# Patient Record
Sex: FEMALE | Race: WHITE | NOT HISPANIC OR LATINO
[De-identification: names, ages, dates, MRNs, and addresses within clinical notes are randomized per-mention and may not be internally consistent; named-entity substitution may affect disease eponyms.]

---

## 2018-02-24 ENCOUNTER — RESULT REVIEW (OUTPATIENT)
Age: 63
End: 2018-02-24

## 2018-10-05 ENCOUNTER — TRANSCRIPTION ENCOUNTER (OUTPATIENT)
Age: 63
End: 2018-10-05

## 2019-03-29 PROBLEM — Z00.00 ENCOUNTER FOR PREVENTIVE HEALTH EXAMINATION: Status: ACTIVE | Noted: 2019-03-29

## 2019-04-02 ENCOUNTER — APPOINTMENT (OUTPATIENT)
Age: 64
End: 2019-04-02
Payer: COMMERCIAL

## 2019-04-02 VITALS — HEIGHT: 65 IN | BODY MASS INDEX: 28.32 KG/M2 | WEIGHT: 170 LBS

## 2019-04-02 PROCEDURE — 99203 OFFICE O/P NEW LOW 30 MIN: CPT

## 2019-04-02 NOTE — PHYSICAL EXAM
[FreeTextEntry1] : Physical exam reveals a healthy-looking patient in no apparent distress the patient appeared to be fully alert oriented and in no significant complaining side low back pain. Admission patient had good range of motion about both hips no swelling no palpable mass no gross neurovascular deficits review of the MRI scan of the lumbar sacral area demonstrates spondylolisthesis at L5-S1 with a benign-appearing nonaggressive lesion involving the lower sacrum. At this stage patient was recommended to be followed conservatively and to be seen again in 9 months with a new MRI scan of the lower sacrum

## 2019-04-02 NOTE — HISTORY OF PRESENT ILLNESS
[FreeTextEntry1] : This is the first and 15th of a 63 years old female with a chief complaint of low back pain. Recently patient was complaining about low back pain x-ray MRI scan demonstrated a mild spondylolisthesis at the level of and 5 S1 however at the same time there was an incidental finding of nonconclusive ill-defined process involving the lower sacral coccyx area

## 2019-04-16 ENCOUNTER — TRANSCRIPTION ENCOUNTER (OUTPATIENT)
Age: 64
End: 2019-04-16

## 2019-05-20 ENCOUNTER — APPOINTMENT (OUTPATIENT)
Dept: ORTHOPEDIC SURGERY | Facility: CLINIC | Age: 64
End: 2019-05-20
Payer: MEDICARE

## 2019-05-20 VITALS
WEIGHT: 170 LBS | BODY MASS INDEX: 28.32 KG/M2 | HEART RATE: 67 BPM | DIASTOLIC BLOOD PRESSURE: 91 MMHG | SYSTOLIC BLOOD PRESSURE: 162 MMHG | HEIGHT: 65 IN

## 2019-05-20 DIAGNOSIS — M51.36 OTHER INTERVERTEBRAL DISC DEGENERATION, LUMBAR REGION: ICD-10-CM

## 2019-05-20 PROCEDURE — 99215 OFFICE O/P EST HI 40 MIN: CPT

## 2019-05-20 PROCEDURE — 72170 X-RAY EXAM OF PELVIS: CPT

## 2019-05-20 PROCEDURE — 72100 X-RAY EXAM L-S SPINE 2/3 VWS: CPT

## 2019-05-29 ENCOUNTER — APPOINTMENT (OUTPATIENT)
Dept: SPINE | Facility: CLINIC | Age: 64
End: 2019-05-29
Payer: MEDICARE

## 2019-05-29 VITALS
BODY MASS INDEX: 28.32 KG/M2 | DIASTOLIC BLOOD PRESSURE: 90 MMHG | WEIGHT: 170 LBS | SYSTOLIC BLOOD PRESSURE: 150 MMHG | HEIGHT: 65 IN

## 2019-05-29 DIAGNOSIS — Z80.8 FAMILY HISTORY OF MALIGNANT NEOPLASM OF OTHER ORGANS OR SYSTEMS: ICD-10-CM

## 2019-05-29 DIAGNOSIS — Z85.828 PERSONAL HISTORY OF OTHER MALIGNANT NEOPLASM OF SKIN: ICD-10-CM

## 2019-05-29 PROCEDURE — 99203 OFFICE O/P NEW LOW 30 MIN: CPT

## 2019-05-30 NOTE — DATA REVIEWED
[de-identified] : Lumbar MRI from [facility] 3/23/2019, MRI of pelvis and sacrum area 3/28/2019 from [facility]

## 2019-05-30 NOTE — ASSESSMENT
[FreeTextEntry1] : Assess:\par Suspicion for chordoma of sacral area S 3\par Right hip and thigh pain\par Possible fracture at the level of S 3\par \par PLAN:\par MRI of sacral  / pelvic area with and without contrast \par Follow up after images \par

## 2019-05-30 NOTE — REASON FOR VISIT
[New Patient Visit] : a new patient visit [Referred By: _________] : Patient was referred by JENS [Family Member] : family member [FreeTextEntry1] : Lumbar bony  lesion

## 2019-05-30 NOTE — HISTORY OF PRESENT ILLNESS
[< 3 months] : less than 3 months [FreeTextEntry1] : Pelvic/Sacral lesion at S 3 bony lesion  [de-identified] : This is a 64 year old female who presented in February 2019 with  right hip pain.  The pain radiates into the right thigh and multiple diagnostic studies have been obtained from orthopedics and ortho-spine specialists.  The pain impacts her walking and recently  was placed on Meloxicam where the pain has significantly improved.   A question of a spinal lesion was noted at S 3 level.  There is a high suspicion that the lesion is a chordoma and should be further investigated.  The patient denies any bowel or bladder symptoms.

## 2019-05-31 ENCOUNTER — TRANSCRIPTION ENCOUNTER (OUTPATIENT)
Age: 64
End: 2019-05-31

## 2019-06-03 ENCOUNTER — APPOINTMENT (OUTPATIENT)
Dept: MRI IMAGING | Facility: CLINIC | Age: 64
End: 2019-06-03
Payer: MEDICARE

## 2019-06-03 ENCOUNTER — OUTPATIENT (OUTPATIENT)
Dept: OUTPATIENT SERVICES | Facility: HOSPITAL | Age: 64
LOS: 1 days | End: 2019-06-03
Payer: MEDICARE

## 2019-06-03 DIAGNOSIS — M51.36 OTHER INTERVERTEBRAL DISC DEGENERATION, LUMBAR REGION: ICD-10-CM

## 2019-06-03 DIAGNOSIS — M89.9 DISORDER OF BONE, UNSPECIFIED: ICD-10-CM

## 2019-06-03 PROCEDURE — 72158 MRI LUMBAR SPINE W/O & W/DYE: CPT | Mod: 26

## 2019-06-03 PROCEDURE — A9585: CPT

## 2019-06-03 PROCEDURE — 72158 MRI LUMBAR SPINE W/O & W/DYE: CPT

## 2019-06-05 ENCOUNTER — APPOINTMENT (OUTPATIENT)
Dept: SPINE | Facility: CLINIC | Age: 64
End: 2019-06-05
Payer: MEDICARE

## 2019-06-05 VITALS
WEIGHT: 170 LBS | SYSTOLIC BLOOD PRESSURE: 124 MMHG | HEIGHT: 65 IN | BODY MASS INDEX: 28.32 KG/M2 | DIASTOLIC BLOOD PRESSURE: 60 MMHG

## 2019-06-05 PROCEDURE — 99213 OFFICE O/P EST LOW 20 MIN: CPT

## 2019-06-05 RX ORDER — TELMISARTAN AND HYDROCHLOROTHIAZIDE 40; 12.5 MG/1; MG/1
40-12.5 TABLET ORAL
Refills: 0 | Status: DISCONTINUED | COMMUNITY

## 2019-06-05 RX ORDER — MELOXICAM 15 MG/1
15 TABLET ORAL
Refills: 0 | Status: DISCONTINUED | COMMUNITY
End: 2019-06-05

## 2019-06-05 RX ORDER — ROSUVASTATIN CALCIUM 10 MG/1
10 TABLET, FILM COATED ORAL
Refills: 0 | Status: ACTIVE | COMMUNITY

## 2019-06-05 NOTE — REASON FOR VISIT
[Family Member] : family member [Follow-Up: _____] : a [unfilled] follow-up visit [FreeTextEntry1] : 64 year old female who presents with a history of hip pain on the  right side radiating to the thigh since February 2019.  She presents today for review of a MRI of the sacral area. A S 3 lesion is present and is suspicious for a chordoma.   She reports her hip pain has improved since her last visit a few weeks ago without any interventions.  She is able to ambulate easier and and going up and down stairs is less taxing.  No bowel or bladder symptoms.

## 2019-06-05 NOTE — ASSESSMENT
[FreeTextEntry1] : Assess:\par Sacral S 3 lesion \par MRI shows S 3 lesion and suspicion for benign lesion but can not rule out concern for malignancy\par \par PLAN:\par Recommend biopsy at S 3 level\par Recommend CT guided biopsy and will refer to Dr Marquis\par Refer to Surgery - Dr Marquis\par Follow up after Dr Marquis evaluation  \par An order for CT of abdomen, pelvis  and chest will be performed to rule out any other lesions

## 2019-06-06 ENCOUNTER — APPOINTMENT (OUTPATIENT)
Dept: SURGICAL ONCOLOGY | Facility: CLINIC | Age: 64
End: 2019-06-06
Payer: MEDICARE

## 2019-06-06 VITALS
HEIGHT: 65 IN | SYSTOLIC BLOOD PRESSURE: 140 MMHG | WEIGHT: 173 LBS | OXYGEN SATURATION: 97 % | BODY MASS INDEX: 28.82 KG/M2 | HEART RATE: 57 BPM | DIASTOLIC BLOOD PRESSURE: 82 MMHG

## 2019-06-06 PROCEDURE — 99205 OFFICE O/P NEW HI 60 MIN: CPT

## 2019-06-06 NOTE — HISTORY OF PRESENT ILLNESS
[de-identified] : 64 year old female presents for initial consultation.  She was referred by Dr. Fawad Bashir.\par \par History of right hip pain with radiation to hip for which she assumed was sciatica. She underwent a MRI pelvis/sacrum and was noted with  an abnormality of the posterior S3 vertebral body with adjacent soft tissue mass suspected within the thecal sac extending within bilateral neural foramina at S3-S4 possibly a chordoma.  She continues to have discomfort although it has improved tremendously.\par \par PMH otherwise significant for HTN and HLD.  She is s/p left lower eyelid surgery for BCC.\par Family history of cancer: Prostate in father and brother.  Glioblastoma in mother.

## 2019-06-06 NOTE — ASSESSMENT
[FreeTextEntry1] : Impression:\par Soft tissue mass posterior to S3.  Rule out chordoma.\par \par \par Plan:\par Scheduled for CT C/A/P on 6/8/19\par CT- guided biopsy of mass\par Probable resection\par

## 2019-06-06 NOTE — CONSULT LETTER
[Dear  ___] : Dear  [unfilled], [Consult Closing:] : Thank you very much for allowing me to participate in the care of this patient.  If you have any questions, please do not hesitate to contact me. [Please see my note below.] : Please see my note below. [Consult Letter:] : I had the pleasure of evaluating your patient, [unfilled]. [Sincerely,] : Sincerely, [FreeTextEntry1] : I will speak to you after the biopsy. [FreeTextEntry3] : Ryan Marquis MD FACS\par Chief of Surgical Oncology\par \par

## 2019-06-08 ENCOUNTER — APPOINTMENT (OUTPATIENT)
Dept: CT IMAGING | Facility: CLINIC | Age: 64
End: 2019-06-08
Payer: MEDICARE

## 2019-06-08 ENCOUNTER — OUTPATIENT (OUTPATIENT)
Dept: OUTPATIENT SERVICES | Facility: HOSPITAL | Age: 64
LOS: 1 days | End: 2019-06-08
Payer: MEDICARE

## 2019-06-08 DIAGNOSIS — M89.9 DISORDER OF BONE, UNSPECIFIED: ICD-10-CM

## 2019-06-08 PROCEDURE — 74177 CT ABD & PELVIS W/CONTRAST: CPT

## 2019-06-08 PROCEDURE — 74177 CT ABD & PELVIS W/CONTRAST: CPT | Mod: 26

## 2019-06-08 PROCEDURE — 71260 CT THORAX DX C+: CPT | Mod: 26

## 2019-06-08 PROCEDURE — 71260 CT THORAX DX C+: CPT

## 2019-06-12 ENCOUNTER — TRANSCRIPTION ENCOUNTER (OUTPATIENT)
Age: 64
End: 2019-06-12

## 2019-06-16 ENCOUNTER — EMERGENCY (EMERGENCY)
Facility: HOSPITAL | Age: 64
LOS: 1 days | Discharge: HOME | End: 2019-06-16
Attending: EMERGENCY MEDICINE | Admitting: EMERGENCY MEDICINE
Payer: MEDICARE

## 2019-06-16 VITALS
OXYGEN SATURATION: 97 % | DIASTOLIC BLOOD PRESSURE: 56 MMHG | HEART RATE: 74 BPM | RESPIRATION RATE: 16 BRPM | SYSTOLIC BLOOD PRESSURE: 97 MMHG | TEMPERATURE: 98 F

## 2019-06-16 VITALS
RESPIRATION RATE: 18 BRPM | WEIGHT: 169.98 LBS | DIASTOLIC BLOOD PRESSURE: 56 MMHG | HEIGHT: 65 IN | TEMPERATURE: 99 F | SYSTOLIC BLOOD PRESSURE: 85 MMHG | HEART RATE: 78 BPM | OXYGEN SATURATION: 95 %

## 2019-06-16 LAB
ALBUMIN SERPL ELPH-MCNC: 4 G/DL — SIGNIFICANT CHANGE UP (ref 3.3–5)
ALP SERPL-CCNC: 102 U/L — SIGNIFICANT CHANGE UP (ref 40–120)
ALT FLD-CCNC: 26 U/L — SIGNIFICANT CHANGE UP (ref 12–78)
ANION GAP SERPL CALC-SCNC: 12 MMOL/L — SIGNIFICANT CHANGE UP (ref 5–17)
AST SERPL-CCNC: 15 U/L — SIGNIFICANT CHANGE UP (ref 15–37)
BASOPHILS # BLD AUTO: 0.09 K/UL — SIGNIFICANT CHANGE UP (ref 0–0.2)
BASOPHILS NFR BLD AUTO: 0.8 % — SIGNIFICANT CHANGE UP (ref 0–2)
BILIRUB SERPL-MCNC: 0.2 MG/DL — SIGNIFICANT CHANGE UP (ref 0.2–1.2)
BUN SERPL-MCNC: 25 MG/DL — HIGH (ref 7–23)
CALCIUM SERPL-MCNC: 8.6 MG/DL — SIGNIFICANT CHANGE UP (ref 8.5–10.1)
CHLORIDE SERPL-SCNC: 107 MMOL/L — SIGNIFICANT CHANGE UP (ref 96–108)
CO2 SERPL-SCNC: 21 MMOL/L — LOW (ref 22–31)
CREAT SERPL-MCNC: 1.2 MG/DL — SIGNIFICANT CHANGE UP (ref 0.5–1.3)
EOSINOPHIL # BLD AUTO: 0.22 K/UL — SIGNIFICANT CHANGE UP (ref 0–0.5)
EOSINOPHIL NFR BLD AUTO: 2 % — SIGNIFICANT CHANGE UP (ref 0–6)
GLUCOSE SERPL-MCNC: 107 MG/DL — HIGH (ref 70–99)
HCT VFR BLD CALC: 42.4 % — SIGNIFICANT CHANGE UP (ref 34.5–45)
HGB BLD-MCNC: 14.2 G/DL — SIGNIFICANT CHANGE UP (ref 11.5–15.5)
IMM GRANULOCYTES NFR BLD AUTO: 0.6 % — SIGNIFICANT CHANGE UP (ref 0–1.5)
LYMPHOCYTES # BLD AUTO: 38.3 % — SIGNIFICANT CHANGE UP (ref 13–44)
LYMPHOCYTES # BLD AUTO: 4.12 K/UL — HIGH (ref 1–3.3)
MCHC RBC-ENTMCNC: 29.6 PG — SIGNIFICANT CHANGE UP (ref 27–34)
MCHC RBC-ENTMCNC: 33.5 GM/DL — SIGNIFICANT CHANGE UP (ref 32–36)
MCV RBC AUTO: 88.5 FL — SIGNIFICANT CHANGE UP (ref 80–100)
MONOCYTES # BLD AUTO: 0.52 K/UL — SIGNIFICANT CHANGE UP (ref 0–0.9)
MONOCYTES NFR BLD AUTO: 4.8 % — SIGNIFICANT CHANGE UP (ref 2–14)
NEUTROPHILS # BLD AUTO: 5.75 K/UL — SIGNIFICANT CHANGE UP (ref 1.8–7.4)
NEUTROPHILS NFR BLD AUTO: 53.5 % — SIGNIFICANT CHANGE UP (ref 43–77)
NRBC # BLD: 0 /100 WBCS — SIGNIFICANT CHANGE UP (ref 0–0)
PLATELET # BLD AUTO: 305 K/UL — SIGNIFICANT CHANGE UP (ref 150–400)
POTASSIUM SERPL-MCNC: 3.9 MMOL/L — SIGNIFICANT CHANGE UP (ref 3.5–5.3)
POTASSIUM SERPL-SCNC: 3.9 MMOL/L — SIGNIFICANT CHANGE UP (ref 3.5–5.3)
PROT SERPL-MCNC: 7.5 G/DL — SIGNIFICANT CHANGE UP (ref 6–8.3)
RBC # BLD: 4.79 M/UL — SIGNIFICANT CHANGE UP (ref 3.8–5.2)
RBC # FLD: 13.2 % — SIGNIFICANT CHANGE UP (ref 10.3–14.5)
SODIUM SERPL-SCNC: 140 MMOL/L — SIGNIFICANT CHANGE UP (ref 135–145)
TROPONIN I SERPL-MCNC: <.015 NG/ML — SIGNIFICANT CHANGE UP (ref 0.01–0.04)
WBC # BLD: 10.76 K/UL — HIGH (ref 3.8–10.5)
WBC # FLD AUTO: 10.76 K/UL — HIGH (ref 3.8–10.5)

## 2019-06-16 PROCEDURE — 85027 COMPLETE CBC AUTOMATED: CPT

## 2019-06-16 PROCEDURE — 84484 ASSAY OF TROPONIN QUANT: CPT

## 2019-06-16 PROCEDURE — 99283 EMERGENCY DEPT VISIT LOW MDM: CPT

## 2019-06-16 PROCEDURE — 99283 EMERGENCY DEPT VISIT LOW MDM: CPT | Mod: 25

## 2019-06-16 PROCEDURE — 93005 ELECTROCARDIOGRAM TRACING: CPT

## 2019-06-16 PROCEDURE — 80053 COMPREHEN METABOLIC PANEL: CPT

## 2019-06-16 PROCEDURE — 36415 COLL VENOUS BLD VENIPUNCTURE: CPT

## 2019-06-16 PROCEDURE — 93010 ELECTROCARDIOGRAM REPORT: CPT

## 2019-06-16 RX ORDER — SODIUM CHLORIDE 9 MG/ML
1000 INJECTION INTRAMUSCULAR; INTRAVENOUS; SUBCUTANEOUS ONCE
Refills: 0 | Status: COMPLETED | OUTPATIENT
Start: 2019-06-16 | End: 2019-06-16

## 2019-06-16 RX ORDER — SODIUM CHLORIDE 9 MG/ML
1000 INJECTION INTRAMUSCULAR; INTRAVENOUS; SUBCUTANEOUS ONCE
Refills: 0 | Status: DISCONTINUED | OUTPATIENT
Start: 2019-06-16 | End: 2019-06-16

## 2019-06-16 RX ADMIN — SODIUM CHLORIDE 2000 MILLILITER(S): 9 INJECTION INTRAMUSCULAR; INTRAVENOUS; SUBCUTANEOUS at 21:38

## 2019-06-16 RX ADMIN — SODIUM CHLORIDE 1000 MILLILITER(S): 9 INJECTION INTRAMUSCULAR; INTRAVENOUS; SUBCUTANEOUS at 22:08

## 2019-06-16 NOTE — ED ADULT NURSE NOTE - CHPI ED NUR SYMPTOMS NEG
no shortness of breath/no nausea/no syncope/no chills/no vomiting/no congestion/no back pain/no diaphoresis/no chest pain/no dizziness/no fever

## 2019-06-16 NOTE — ED ADULT NURSE NOTE - NSIMPLEMENTINTERV_GEN_ALL_ED
Implemented All Universal Safety Interventions:  [place] to call system. Call bell, personal items and telephone within reach. Instruct patient to call for assistance. Room bathroom lighting operational. Non-slip footwear when patient is off stretcher. Physically safe environment: no spills, clutter or unnecessary equipment. Stretcher in lowest position, wheels locked, appropriate side rails in place.

## 2019-06-16 NOTE — ED ADULT NURSE NOTE - OBJECTIVE STATEMENT
Patient with history of HTN (micardis/ hctz) and high cholesterol (crestor) BIBA from home after daughter called 911 after seeing mom "slumped over in the car and moaning".  Patient does not remember this.  Patient was coming home from dinner, had a couple of glasses of wine, and felt neck "weakness" and "wasn't feeling good".  Per daughter, patient was diaphoretic and pale.  Patient started on micardis/ hctz and crestor on june 1st, smokes a half pack of cigarettes every day and has not previously had cardiac work up. Patient is new to the area and recently began seeing her new PMD.  Patient states recent dx of        and is scheduled for biopsy of mass found after lumbar.  Patient arrives hypotensive; does not take BP at home.  Patient currently at her baseline, is A& O x4 and neurologically intact.  Denies CP, SOB, lightheadedness, dizziness, prior episodes of syncope, back pain, neck pain or weakness, numbness and tingling.  Daughters at the bedside, bed in the lowest position, safety maintained.  Pending EKG. Patient with history of HTN (micardis/ hctz) and high cholesterol (crestor) BIBA from home after daughter called 911 after seeing mom "slumped over in the car and moaning".  Patient does not remember this.  Patient was coming home from dinner, had a couple of glasses of wine, and felt neck "weakness" and "wasn't feeling good".  Per daughter, patient was diaphoretic and pale.  Patient started on micardis/ hctz and crestor on june 1st, smokes a half pack of cigarettes every day and has not previously had cardiac work up. Patient is new to the area and recently began seeing her new PMD.  Patient states recent dx of sacral CA and is scheduled for biopsy.  Patient arrives hypotensive; does not take BP at home.  Patient currently at her baseline, is A& O x4 and neurologically intact.  Denies CP, SOB, lightheadedness, dizziness, prior episodes of syncope, back pain, neck pain or weakness, numbness and tingling.  Daughters at the bedside, bed in the lowest position, safety maintained.  Pending EKG.

## 2019-06-16 NOTE — ED ADULT NURSE REASSESSMENT NOTE - NS ED NURSE REASSESS COMMENT FT1
Patient ambulatory independently with steady gait to the bathroom.  Patient denies lightheadedness or dizziness.  Patient is ok for discharge and educated on importance of discontinuing BP meds until she sees her PMD.  Patient verbalizes understanding to call PMD to make an appointment tomorrow morning when the office opens.   is at the bedside and will drive patient home.

## 2019-06-16 NOTE — ED ADULT NURSE NOTE - CHIEF COMPLAINT QUOTE
BIBEMS for dizziness and lightheadedness. Patient states she was started on a new medication recently.

## 2019-06-17 RX ORDER — ROSUVASTATIN CALCIUM 5 MG/1
1 TABLET ORAL
Qty: 0 | Refills: 0 | DISCHARGE

## 2019-06-17 RX ORDER — HYDROCHLOROTHIAZIDE 25 MG
0 TABLET ORAL
Qty: 0 | Refills: 0 | DISCHARGE

## 2019-06-19 ENCOUNTER — APPOINTMENT (OUTPATIENT)
Dept: SPINE | Facility: CLINIC | Age: 64
End: 2019-06-19
Payer: MEDICARE

## 2019-06-19 VITALS
WEIGHT: 173 LBS | DIASTOLIC BLOOD PRESSURE: 80 MMHG | SYSTOLIC BLOOD PRESSURE: 130 MMHG | BODY MASS INDEX: 28.82 KG/M2 | HEIGHT: 65 IN

## 2019-06-19 PROCEDURE — 99213 OFFICE O/P EST LOW 20 MIN: CPT

## 2019-06-20 NOTE — REASON FOR VISIT
[Follow-Up: _____] : a [unfilled] follow-up visit [Family Member] : family member [FreeTextEntry1] : 64 year old with a history of right hip apin with groin pain since February 2019.  She has undergone multiple neuroimaging and  a S 3 lesion was noted.  She has undergone a CT of abdomen, pelvis an chest to rule out other  etiologies.  The scans are negative bu the S 3 lesion shows multiple lesions surrounding the S 3 area.  She has actually improved with her pain but her right leg is painful.    It still remains unclear what type of lesion is present in S3 .

## 2019-06-20 NOTE — ASSESSMENT
[FreeTextEntry1] : Assess:\par Right hip pain and back pain \par S 3 lesion \par Tt of abdomen and pelvis show no other lesions\par \par PLAN:  \par CT guided biopsy of S 3 lesion ordered\par Blood work of pT PTT INR and CBC ordered in prep\par She will return after biopsy complete\par

## 2019-07-02 ENCOUNTER — RESULT REVIEW (OUTPATIENT)
Age: 64
End: 2019-07-02

## 2019-07-02 ENCOUNTER — OUTPATIENT (OUTPATIENT)
Dept: OUTPATIENT SERVICES | Facility: HOSPITAL | Age: 64
LOS: 1 days | End: 2019-07-02
Payer: MEDICARE

## 2019-07-02 ENCOUNTER — APPOINTMENT (OUTPATIENT)
Dept: CT IMAGING | Facility: HOSPITAL | Age: 64
End: 2019-07-02

## 2019-07-02 DIAGNOSIS — Z00.00 ENCOUNTER FOR GENERAL ADULT MEDICAL EXAMINATION WITHOUT ABNORMAL FINDINGS: ICD-10-CM

## 2019-07-02 DIAGNOSIS — M51.36 OTHER INTERVERTEBRAL DISC DEGENERATION, LUMBAR REGION: ICD-10-CM

## 2019-07-02 PROCEDURE — 88172 CYTP DX EVAL FNA 1ST EA SITE: CPT

## 2019-07-02 PROCEDURE — 88365 INSITU HYBRIDIZATION (FISH): CPT

## 2019-07-02 PROCEDURE — 88360 TUMOR IMMUNOHISTOCHEM/MANUAL: CPT

## 2019-07-02 PROCEDURE — 88341 IMHCHEM/IMCYTCHM EA ADD ANTB: CPT

## 2019-07-02 PROCEDURE — 88341 IMHCHEM/IMCYTCHM EA ADD ANTB: CPT | Mod: 26,59

## 2019-07-02 PROCEDURE — 88365 INSITU HYBRIDIZATION (FISH): CPT | Mod: 26,59

## 2019-07-02 PROCEDURE — 88342 IMHCHEM/IMCYTCHM 1ST ANTB: CPT

## 2019-07-02 PROCEDURE — 88364 INSITU HYBRIDIZATION (FISH): CPT

## 2019-07-02 PROCEDURE — 88342 IMHCHEM/IMCYTCHM 1ST ANTB: CPT | Mod: 26,59

## 2019-07-02 PROCEDURE — 88305 TISSUE EXAM BY PATHOLOGIST: CPT | Mod: 26

## 2019-07-02 PROCEDURE — 88364 INSITU HYBRIDIZATION (FISH): CPT | Mod: 26

## 2019-07-02 PROCEDURE — 88173 CYTOPATH EVAL FNA REPORT: CPT

## 2019-07-02 PROCEDURE — 88173 CYTOPATH EVAL FNA REPORT: CPT | Mod: 26

## 2019-07-02 PROCEDURE — 77012 CT SCAN FOR NEEDLE BIOPSY: CPT | Mod: 26

## 2019-07-02 PROCEDURE — 88305 TISSUE EXAM BY PATHOLOGIST: CPT

## 2019-07-02 PROCEDURE — 88360 TUMOR IMMUNOHISTOCHEM/MANUAL: CPT | Mod: 26

## 2019-07-02 PROCEDURE — 20225 BONE BIOPSY TROCAR/NDL DEEP: CPT

## 2019-07-02 PROCEDURE — 77012 CT SCAN FOR NEEDLE BIOPSY: CPT

## 2019-07-10 LAB — NON-GYNECOLOGICAL CYTOLOGY STUDY: SIGNIFICANT CHANGE UP

## 2019-07-16 ENCOUNTER — MOBILE ON CALL (OUTPATIENT)
Age: 64
End: 2019-07-16

## 2019-07-18 ENCOUNTER — OUTPATIENT (OUTPATIENT)
Dept: OUTPATIENT SERVICES | Facility: HOSPITAL | Age: 64
LOS: 1 days | Discharge: HOME | End: 2019-07-18

## 2019-07-18 DIAGNOSIS — C90.00 MULTIPLE MYELOMA NOT HAVING ACHIEVED REMISSION: ICD-10-CM

## 2019-07-22 ENCOUNTER — RESULT REVIEW (OUTPATIENT)
Age: 64
End: 2019-07-22

## 2019-07-22 ENCOUNTER — APPOINTMENT (OUTPATIENT)
Dept: HEMATOLOGY ONCOLOGY | Facility: CLINIC | Age: 64
End: 2019-07-22
Payer: MEDICARE

## 2019-07-22 VITALS
TEMPERATURE: 99.3 F | HEART RATE: 82 BPM | OXYGEN SATURATION: 99 % | DIASTOLIC BLOOD PRESSURE: 83 MMHG | BODY MASS INDEX: 29.17 KG/M2 | RESPIRATION RATE: 16 BRPM | WEIGHT: 175.25 LBS | SYSTOLIC BLOOD PRESSURE: 143 MMHG

## 2019-07-22 LAB
BASOPHILS # BLD AUTO: 0.1 K/UL — SIGNIFICANT CHANGE UP (ref 0–0.2)
BASOPHILS NFR BLD AUTO: 1.2 % — SIGNIFICANT CHANGE UP (ref 0–2)
EOSINOPHIL # BLD AUTO: 0.1 K/UL — SIGNIFICANT CHANGE UP (ref 0–0.5)
EOSINOPHIL NFR BLD AUTO: 1.8 % — SIGNIFICANT CHANGE UP (ref 0–6)
HCT VFR BLD CALC: 41.2 % — SIGNIFICANT CHANGE UP (ref 34.5–45)
HGB BLD-MCNC: 13.6 G/DL — SIGNIFICANT CHANGE UP (ref 11.5–15.5)
LYMPHOCYTES # BLD AUTO: 3 K/UL — SIGNIFICANT CHANGE UP (ref 1–3.3)
LYMPHOCYTES # BLD AUTO: 40.2 % — SIGNIFICANT CHANGE UP (ref 13–44)
MCHC RBC-ENTMCNC: 30.1 PG — SIGNIFICANT CHANGE UP (ref 27–34)
MCHC RBC-ENTMCNC: 33 G/DL — SIGNIFICANT CHANGE UP (ref 32–36)
MCV RBC AUTO: 91 FL — SIGNIFICANT CHANGE UP (ref 80–100)
MONOCYTES # BLD AUTO: 0.5 K/UL — SIGNIFICANT CHANGE UP (ref 0–0.9)
MONOCYTES NFR BLD AUTO: 6.8 % — SIGNIFICANT CHANGE UP (ref 2–14)
NEUTROPHILS # BLD AUTO: 3.7 K/UL — SIGNIFICANT CHANGE UP (ref 1.8–7.4)
NEUTROPHILS NFR BLD AUTO: 50 % — SIGNIFICANT CHANGE UP (ref 43–77)
PLATELET # BLD AUTO: 254 K/UL — SIGNIFICANT CHANGE UP (ref 150–400)
RBC # BLD: 4.52 M/UL — SIGNIFICANT CHANGE UP (ref 3.8–5.2)
RBC # FLD: 12.5 % — SIGNIFICANT CHANGE UP (ref 10.3–14.5)
WBC # BLD: 7.5 K/UL — SIGNIFICANT CHANGE UP (ref 3.8–10.5)
WBC # FLD AUTO: 7.5 K/UL — SIGNIFICANT CHANGE UP (ref 3.8–10.5)

## 2019-07-22 PROCEDURE — 36415 COLL VENOUS BLD VENIPUNCTURE: CPT

## 2019-07-22 PROCEDURE — 99204 OFFICE O/P NEW MOD 45 MIN: CPT | Mod: 25

## 2019-07-24 ENCOUNTER — APPOINTMENT (OUTPATIENT)
Dept: HEMATOLOGY ONCOLOGY | Facility: CLINIC | Age: 64
End: 2019-07-24
Payer: MEDICARE

## 2019-07-24 ENCOUNTER — LABORATORY RESULT (OUTPATIENT)
Age: 64
End: 2019-07-24

## 2019-07-24 ENCOUNTER — RESULT REVIEW (OUTPATIENT)
Age: 64
End: 2019-07-24

## 2019-07-24 ENCOUNTER — OUTPATIENT (OUTPATIENT)
Dept: OUTPATIENT SERVICES | Facility: HOSPITAL | Age: 64
LOS: 1 days | End: 2019-07-24
Payer: MEDICARE

## 2019-07-24 VITALS
TEMPERATURE: 98.4 F | BODY MASS INDEX: 28.98 KG/M2 | WEIGHT: 174.16 LBS | RESPIRATION RATE: 16 BRPM | OXYGEN SATURATION: 96 % | SYSTOLIC BLOOD PRESSURE: 149 MMHG | HEART RATE: 57 BPM | DIASTOLIC BLOOD PRESSURE: 81 MMHG

## 2019-07-24 DIAGNOSIS — C90.00 MULTIPLE MYELOMA NOT HAVING ACHIEVED REMISSION: ICD-10-CM

## 2019-07-24 LAB
ALBUMIN SERPL ELPH-MCNC: 4.4 G/DL
ALP BLD-CCNC: 90 U/L
ALT SERPL-CCNC: 19 U/L
ANION GAP SERPL CALC-SCNC: 12 MMOL/L
AST SERPL-CCNC: 16 U/L
B2 MICROGLOB SERPL-MCNC: 2.2 MG/L
BILIRUB SERPL-MCNC: 0.2 MG/DL
BUN SERPL-MCNC: 11 MG/DL
CALCIUM SERPL-MCNC: 9.5 MG/DL
CHLORIDE SERPL-SCNC: 106 MMOL/L
CO2 SERPL-SCNC: 24 MMOL/L
CREAT SERPL-MCNC: 0.67 MG/DL
DEPRECATED KAPPA LC FREE/LAMBDA SER: 1.93 RATIO
GLUCOSE SERPL-MCNC: 99 MG/DL
KAPPA LC CSF-MCNC: 1.11 MG/DL
KAPPA LC SERPL-MCNC: 2.14 MG/DL
LDH SERPL-CCNC: 153 U/L
POTASSIUM SERPL-SCNC: 4.3 MMOL/L
PROT SERPL-MCNC: 7.1 G/DL
SODIUM SERPL-SCNC: 142 MMOL/L

## 2019-07-24 PROCEDURE — 88189 FLOWCYTOMETRY/READ 16 & >: CPT

## 2019-07-24 PROCEDURE — 88275 CYTOGENETICS 100-300: CPT

## 2019-07-24 PROCEDURE — 88365 INSITU HYBRIDIZATION (FISH): CPT | Mod: 26,59

## 2019-07-24 PROCEDURE — 88313 SPECIAL STAINS GROUP 2: CPT

## 2019-07-24 PROCEDURE — 88342 IMHCHEM/IMCYTCHM 1ST ANTB: CPT | Mod: 26,59

## 2019-07-24 PROCEDURE — 88360 TUMOR IMMUNOHISTOCHEM/MANUAL: CPT

## 2019-07-24 PROCEDURE — 88365 INSITU HYBRIDIZATION (FISH): CPT

## 2019-07-24 PROCEDURE — 85097 BONE MARROW INTERPRETATION: CPT

## 2019-07-24 PROCEDURE — 88342 IMHCHEM/IMCYTCHM 1ST ANTB: CPT

## 2019-07-24 PROCEDURE — 88271 CYTOGENETICS DNA PROBE: CPT

## 2019-07-24 PROCEDURE — 88185 FLOWCYTOMETRY/TC ADD-ON: CPT

## 2019-07-24 PROCEDURE — 38222 DX BONE MARROW BX & ASPIR: CPT | Mod: RT

## 2019-07-24 PROCEDURE — 88305 TISSUE EXAM BY PATHOLOGIST: CPT | Mod: 26

## 2019-07-24 PROCEDURE — 88280 CHROMOSOME KARYOTYPE STUDY: CPT

## 2019-07-24 PROCEDURE — 88360 TUMOR IMMUNOHISTOCHEM/MANUAL: CPT | Mod: 26

## 2019-07-24 PROCEDURE — 88364 INSITU HYBRIDIZATION (FISH): CPT | Mod: 26

## 2019-07-24 PROCEDURE — 87205 SMEAR GRAM STAIN: CPT

## 2019-07-24 PROCEDURE — 88264 CHROMOSOME ANALYSIS 20-25: CPT

## 2019-07-24 PROCEDURE — 88184 FLOWCYTOMETRY/ TC 1 MARKER: CPT

## 2019-07-24 PROCEDURE — 88305 TISSUE EXAM BY PATHOLOGIST: CPT

## 2019-07-24 PROCEDURE — 88237 TISSUE CULTURE BONE MARROW: CPT

## 2019-07-24 PROCEDURE — 88313 SPECIAL STAINS GROUP 2: CPT | Mod: 26

## 2019-07-24 PROCEDURE — 88364 INSITU HYBRIDIZATION (FISH): CPT

## 2019-07-24 NOTE — HISTORY OF PRESENT ILLNESS
[de-identified] : 65 yo female with a recent hx of severe R pelvic pain seen by ortho in June and had MRI done of L-S spine done June 3rd showed S3 soft tissue lesion with epidural extension concerning for possible neoplasm or mets, CT imaging on June 8th showed S3 lytic lesion in vertebral body, lucencies in T2, T7, T11 possibly representing hemangiomas vs lytic lesions. Pt had biopsy done by IR on July 3rd, 2019, which was nondiagnostic. Showed polytypic plasma cells, comprising less than 5% of the cells. The bone marrow showed normal trilineage hematopoesis. Additional kappa and lambda restriction is in process. The pt was referred for hematology evaluation for concern of multiple myeloma. She is accompanied with  and dtr to the office visit. She was also advised to see Dr Hart for BMT and RadOnc (Dr Hdez).\par Pt reports marked improvement in R pelvic pain, now able to walk and move w/o significant discomfort. No intervention has been done for S3 lesion as yet. She has no hx of anemia or renal insufficiency or other areas of bone pain. She has no hx of weight loss or fatigue.  [Disease:__________________________] : Disease: [unfilled]

## 2019-07-24 NOTE — CONSULT LETTER
[Dear  ___] : Dear  [unfilled], [Consult Letter:] : I had the pleasure of evaluating your patient, [unfilled]. [Please see my note below.] : Please see my note below. [Consult Closing:] : Thank you very much for allowing me to participate in the care of this patient.  If you have any questions, please do not hesitate to contact me. [Sincerely,] : Sincerely, [FreeTextEntry3] : Rosa Suazo MD. MS. \par Hematologist/Oncologist\par [facility]\par ph. [phone]\par fx. [phone]\par  [DrJonathan  ___] : Dr. COLINDRES

## 2019-07-24 NOTE — RESULTS/DATA
[FreeTextEntry1] : \par  Pathology             Final\par \par No Documents Attached\par \par \par \par \par   [facility] Accession Number : [id number]\par \par JANES MOTA 2\par \par \par \par Cytopathology Report\par \par \par \par \par \par Final Diagnosis\par BONE, VERTEBRAL BODY, S3, CT GUIDED CORE BIOPSY AND FNA\par Cellular marrow with maturing trilineage hematopoiesis and mild\par plasmacytosis\par See note and description.\par \par Diagnostic note:  Per chart review, patient has lytic lesions in\par vertebral body S3. The current biopsy consists of fragmented\par marrow showing cellular marrow with maturing trilineage\par hematopoiesis and mild plasmacytosis. The clonality of the plasma\par cell population cannot be determined by kappaISH and lambdaISH\par studies. Immunohistochemistry work up for light chain restriction\par are in proces and will be reported as an addendum. Correlation\par with clinical findings (e.g.: serum immunoglobulin levels, SPEP,\par IF studies) and if clinically indicated a bone marrow biopsy with\par flow cytometry and cytogenetic analysis is recommended.\par \par Microscopic description:   Cytology slides, touch preps, cell\par block and histologic sections of core biopsy consists of small\par fragments of marrow elements with 40-50% cellularity showing\par maturing trilineage hematopoiesis with mild plasmacytosis. Plasma\par cells are mostly distributed perivascularly and some\par interstitially, occasional small groups are seen.\par \par Immunohistochemistry:  CD20, CD3, CD34, , AE1-3, ,\par KappaISH, lambdaISH, kappa and lambda, MPO, CD71, E-cad, CD15\par stains  performed on formalin fixed paraffin embedded tissue,\par block 1B.\par \par CD34: Highlights vascular structures, no increase in blast\par population\par : Highlights erythroid series and scattered masts cells. No\par increase in blast population\par CD20: Highlights small B cells scattered interstitially\par CD3: Highlights small T cells scattered interstitially\par AE1-3: Negative\par \par  stain highlights plasma cells forming small clusters,\par comprising less than 5% marrow cellularity. KappaISH and\par lambdaISH stains are non-contributory.\par \par Kappa and lambda light chain immunostains are in process and will\par be reported as an addendum.\par \par MPO, CD71, E-cad and CD15 immunostains are in process and will be\par reported as an addendum.\par \par \par \par \par \par JANES MOTA                          2\par \par \par \par Cytopathology Report\par \par \par \par \par \par Flow cytometry analysis:  Not submitted\par \par Screened by: Christina MERCER (ASCP)\par Verified by: Vicki Prajapati MD\par (Electronic Signature)\par Reported on: 07/10/19 12:55 EDT, 6 [facility], [place], [place]\par [address]\par Cytology technical processing performed at [address] [place] [id number]\par _________________________________________________________________\par \par \par Specimen(s) Submitted\par BONE, VERTEBRAL BODY, S3, CT GUIDED CORE BIOPSY AND FNA\par \par \par Clinical Information\par Spinal cord lesion. Rule out cancer. No history of cancer. Rule\par out cordoma.\par \par CT Chest, abdomen and pelvis (06/08/2019): Lytic lesions of S3\par vertebral body, lucency in posterior T 1,  anterior T7, and\par anterior T2 vertebral bodies are indeterminate, possible\par representing small hemangiomas versus additional lytic lesions\par \par \par Gross Description\par Core Biopsy:\par Tissue collected: Specimen container has been inspected to\par confirm patient?s name and date of birth, contains multiple tan\par cores measuring 0.3 - 1.0 cm in length (and multiple fragments).\par Entire specimen submitted in 2 cassette(s) labeled 1B and 1C.\par \par 1 Touch prep slides ( 1 air dried + 0 fixed)\par \par FNA:\par Received: 4 air dried and 4 fixed slides\par Needle rinses in 20 ml formalin\par Submitted: cell block in one cassette labeled 1A\par \par Prepared:\par 1 Touch Prep, 4 Diff Quick,  4 Pap Stained slides\par 1 cell block  labeled 1A\par 1 core biopsy labeled 1B\par 1 core biopsy labeled 1C\par 12 Total slides\par \par  \par \par  Ordered by: DOCUMENT, SCM       Collected/Examined: [id number] 03:11PM       \par Verification Not Required       Stage: Final       \par  Performed at: [facility]       Resulted: 10Jul2019 12:55PM       Last Updated: 10Jul2019 12:56PM       Accession: [id number]       \par  Pathology             Final\par \par No Documents Attached\par \par \par \par \par   [facility] Accession Number : [id number]\par \par JANES MOTA                          3\par \par \par \par Cytopathology Addendum Report\par \par \par \par \par Addendum\par For reporting additional immunostain results\par \par Immunohistochemistry:\par Kappa and lambda light chain stains looks polyclonal in this\par biopsy with high background staining.\par CD71: Highlights erythroid series\par E-cad: Negative\par MPO: Highlights myeloid series\par CD15: Highlights maturing myeloid series\par \par The diagnosis remains unchanged.\par \par Verified by: Vicki Prajapati MD\par (Electronic Signature)\par Reported on: 07/15/19 15:10 EDT, 6 [place], [place]\par [address]\par _________________________________________________________________\par \par \par Cytopathology Report\par \par \par \par \par \par Final Diagnosis\par BONE, VERTEBRAL BODY, S3, CT GUIDED CORE BIOPSY AND FNA\par Cellular marrow with maturing trilineage hematopoiesis and mild\par plasmacytosis\par See note and description.\par \par Diagnostic note:  Per chart review, patient has lytic lesions in\par vertebral body S3. The current biopsy consists of fragmented\par marrow showing cellular marrow with maturing trilineage\par hematopoiesis and mild plasmacytosis. The clonality of the plasma\par cell population cannot be determined by kappaISH and lambdaISH\par studies. Immunohistochemistry work up for light chain restriction\par are in proces and will be reported as an addendum. Correlation\par with clinical findings (e.g.: serum immunoglobulin levels, SPEP,\par IF studies) and if clinically indicated a bone marrow biopsy with\par flow cytometry and cytogenetic analysis is recommended.\par \par Microscopic description:   Cytology slides, touch preps, cell\par block and histologic sections of core biopsy consists of small\par fragments of marrow elements with 40-50% cellularity showing\par maturing trilineage hematopoiesis with mild\par \par \par \par \par \par KODI MOTAANN                          3\par \par \par \par Cytopathology Report\par \par \par \par \par plasmacytosis. Plasma cells are mostly distributed perivascularly\par and some interstitially, occasional small groups are seen.\par \par Immunohistochemistry:  CD20, CD3, CD34, , AE1-3, ,\par KappaISH, lambdaISH, kappa and lambda, MPO, CD71, E-cad, CD15\par stains  performed on formalin fixed paraffin embedded tissue,\par block 1B.\par \par CD34: Highlights vascular structures, no increase in blast\par population\par : Highlights erythroid series and scattered masts cells. No\par increase in blast population\par CD20: Highlights small B cells scattered interstitially\par CD3: Highlights small T cells scattered interstitially\par AE1-3: Negative\par \par  stain highlights plasma cells forming small clusters,\par comprising less than 5% marrow cellularity. KappaISH and\par lambdaISH stains are non-contributory.\par \par Kappa and lambda light chain immunostains are in process and will\par be reported as an addendum.\par \par MPO, CD71, E-cad and CD15 immunostains are in process and will be\par reported as an addendum.\par \par Flow cytometry analysis:  Not submitted\par \par Screened by: Christina MERCER (ASCP)\par Verified by: Vicki Prajapati MD\par (Electronic Signature)\par Reported on: 07/10/19 12:55 EDT, 6 [facility], [place]\par [address]\par Cytology technical processing performed at [address] [place] [address], [place] [address]\par _________________________________________________________________\par \par \par Specimen(s) Submitted\par BONE, VERTEBRAL BODY, S3, CT GUIDED CORE BIOPSY AND FNA\par \par \par Clinical Information\par Spinal cord lesion. Rule out cancer. No history of cancer. Rule\par out cordoma.\par \par \par \par \par \par \par \par JANES MOTA                          3\par \par \par \par Cytopathology Report\par \par \par \par \par CT Chest, abdomen and pelvis (06/08/2019): Lytic lesions of S3\par vertebral body, lucency in posterior T 1,  anterior T7, and\par anterior T2 vertebral bodies are indeterminate, possible\par representing small hemangiomas versus additional lytic lesions\par \par \par Gross Description\par Core Biopsy:\par Tissue collected: Specimen container has been inspected to\par confirm patient?s name and date of birth, contains multiple tan\par cores measuring 0.3 - 1.0 cm in length (and multiple fragments).\par Entire specimen submitted in 2 cassette(s) labeled 1B and 1C.\par \par 1 Touch prep slides ( 1 air dried + 0 fixed)\par \par FNA:\par Received: 4 air dried and 4 fixed slides\par Needle rinses in 20 ml formalin\par Submitted: cell block in one cassette labeled 1A\par \par Prepared:\par 1 Touch Prep, 4 Diff Quick,  4 Pap Stained slides\par 1 cell block  labeled 1A\par 1 core biopsy labeled 1B\par 1 core biopsy labeled 1C\par 12 Total slides\par \par  \par \par  Ordered by: DOCUMENT, SCM       Collected/Examined: [id number] 03:11PM       \par Verification Not Required       Stage: Final       \par  Performed at: [facility]       Resulted: [id number] 03:10PM       Last Updated: 15Jul2019 03:10PM       Accession: [id number]

## 2019-07-24 NOTE — REVIEW OF SYSTEMS
[Joint Pain] : joint pain [Joint Stiffness] : no joint stiffness [Muscle Pain] : no muscle pain [Muscle Weakness] : no muscle weakness [Negative] : Allergic/Immunologic [FreeTextEntry9] : L achilles tendonitis

## 2019-07-24 NOTE — REASON FOR VISIT
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

## 2019-07-24 NOTE — REASON FOR VISIT
[Bone Marrow Biopsy] : bone marrow biopsy [Bone Marrow Aspiration] : bone marrow aspiration [Family Member] : family member [FreeTextEntry2] : R/O MM, Amyloid

## 2019-07-24 NOTE — PROCEDURE
[Bone Marrow Biopsy] : bone marrow biopsy [Patient] : the patient [Bone Marrow Aspiration] : bone marrow aspiration  [Correct positioning] : correct positioning [Patient identification verified] : patient identification verified [Prone] : prone [The right posterior iliac crest was prepped with betadine and draped, using sterile technique.] : The right posterior iliac crest was prepped with betadine and draped, using sterile technique. [Aspirate] : aspirate [Lidocaine was injected and into the periosteum overlying the site.] : Lidocaine was injected and into the periosteum overlying the site. [FISH] : FISH [Cytogenetics] : cytogenetics [Other ___] : [unfilled] [Flow Cytometry] : flow cytometry [Biopsy] : biopsy [FreeTextEntry1] : R/O MM, Amyloid [] : The patient was instructed to remove the bandage the following AM. The patient may bathe. Acetaminophen may be taken for discomfort, as per package directions.If there are any other problems, the patient was instructed to call the office. The patient verbalized understanding, and is aware of the office contact numbers. [FreeTextEntry2] : CBC done on 7/22/19.\par WBC 7.5\par Hgb 13.6\par Hct 41.2\par Plts 254\par BM bx and aspirate done.  Samples send for [place] Red testing.

## 2019-07-24 NOTE — PHYSICAL EXAM
[Restricted in physically strenuous activity but ambulatory and able to carry out work of a light or sedentary nature] : Status 1- Restricted in physically strenuous activity but ambulatory and able to carry out work of a light or sedentary nature, e.g., light house work, office work [Obese] : obese [de-identified] : ttp of L achilles heel [Normal] : affect appropriate

## 2019-07-25 ENCOUNTER — FORM ENCOUNTER (OUTPATIENT)
Age: 64
End: 2019-07-25

## 2019-07-25 LAB
ALBUMIN MFR SERPL ELPH: 55.7 %
ALBUMIN SERPL-MCNC: 4 G/DL
ALBUMIN/GLOB SERPL: 1.3 RATIO
ALPHA1 GLOB MFR SERPL ELPH: 5 %
ALPHA1 GLOB SERPL ELPH-MCNC: 0.4 G/DL
ALPHA2 GLOB MFR SERPL ELPH: 12.6 %
ALPHA2 GLOB SERPL ELPH-MCNC: 0.9 G/DL
B-GLOBULIN MFR SERPL ELPH: 19.1 %
B-GLOBULIN SERPL ELPH-MCNC: 1.4 G/DL
DEPRECATED KAPPA LC FREE/LAMBDA SER: 1.93 RATIO
GAMMA GLOB FLD ELPH-MCNC: 0.5 G/DL
GAMMA GLOB MFR SERPL ELPH: 7.6 %
IGA SER QL IEP: 552 MG/DL
IGG SER QL IEP: 549 MG/DL
IGM SER QL IEP: 35 MG/DL
INTERPRETATION SERPL IEP-IMP: NORMAL
KAPPA LC CSF-MCNC: 1.11 MG/DL
KAPPA LC SERPL-MCNC: 2.14 MG/DL
M PROTEIN MFR SERPL ELPH: 7.5 %
M PROTEIN SPEC IFE-MCNC: NORMAL
MONOCLON BAND OBS SERPL: 0.5 G/DL
PROT SERPL-MCNC: 7.1 G/DL
PROT SERPL-MCNC: 7.1 G/DL
TM INTERPRETATION: SIGNIFICANT CHANGE UP

## 2019-07-26 ENCOUNTER — APPOINTMENT (OUTPATIENT)
Dept: NUCLEAR MEDICINE | Facility: CLINIC | Age: 64
End: 2019-07-26
Payer: MEDICARE

## 2019-07-26 ENCOUNTER — OUTPATIENT (OUTPATIENT)
Dept: OUTPATIENT SERVICES | Facility: HOSPITAL | Age: 64
LOS: 1 days | End: 2019-07-26
Payer: MEDICARE

## 2019-07-26 DIAGNOSIS — Z00.8 ENCOUNTER FOR OTHER GENERAL EXAMINATION: ICD-10-CM

## 2019-07-26 PROCEDURE — A9552: CPT

## 2019-07-26 PROCEDURE — 78816 PET IMAGE W/CT FULL BODY: CPT

## 2019-07-26 PROCEDURE — 78816 PET IMAGE W/CT FULL BODY: CPT | Mod: 26,PI

## 2019-07-31 LAB — HEMATOPATHOLOGY REPORT: SIGNIFICANT CHANGE UP

## 2019-08-01 ENCOUNTER — APPOINTMENT (OUTPATIENT)
Dept: HEMATOLOGY ONCOLOGY | Facility: CLINIC | Age: 64
End: 2019-08-01
Payer: MEDICARE

## 2019-08-01 ENCOUNTER — RESULT REVIEW (OUTPATIENT)
Age: 64
End: 2019-08-01

## 2019-08-01 VITALS
SYSTOLIC BLOOD PRESSURE: 139 MMHG | OXYGEN SATURATION: 99 % | WEIGHT: 173.92 LBS | HEART RATE: 60 BPM | BODY MASS INDEX: 28.95 KG/M2 | RESPIRATION RATE: 16 BRPM | TEMPERATURE: 99.1 F | DIASTOLIC BLOOD PRESSURE: 77 MMHG

## 2019-08-01 LAB
BASOPHILS # BLD AUTO: 0 K/UL — SIGNIFICANT CHANGE UP (ref 0–0.2)
BASOPHILS NFR BLD AUTO: 0.5 % — SIGNIFICANT CHANGE UP (ref 0–2)
EOSINOPHIL # BLD AUTO: 0.2 K/UL — SIGNIFICANT CHANGE UP (ref 0–0.5)
EOSINOPHIL NFR BLD AUTO: 2.3 % — SIGNIFICANT CHANGE UP (ref 0–6)
HCT VFR BLD CALC: 41.2 % — SIGNIFICANT CHANGE UP (ref 34.5–45)
HGB BLD-MCNC: 13.8 G/DL — SIGNIFICANT CHANGE UP (ref 11.5–15.5)
LYMPHOCYTES # BLD AUTO: 3.6 K/UL — HIGH (ref 1–3.3)
LYMPHOCYTES # BLD AUTO: 40.2 % — SIGNIFICANT CHANGE UP (ref 13–44)
MCHC RBC-ENTMCNC: 30.6 PG — SIGNIFICANT CHANGE UP (ref 27–34)
MCHC RBC-ENTMCNC: 33.5 G/DL — SIGNIFICANT CHANGE UP (ref 32–36)
MCV RBC AUTO: 91.3 FL — SIGNIFICANT CHANGE UP (ref 80–100)
MONOCYTES # BLD AUTO: 0.4 K/UL — SIGNIFICANT CHANGE UP (ref 0–0.9)
MONOCYTES NFR BLD AUTO: 4.9 % — SIGNIFICANT CHANGE UP (ref 2–14)
NEUTROPHILS # BLD AUTO: 4.7 K/UL — SIGNIFICANT CHANGE UP (ref 1.8–7.4)
NEUTROPHILS NFR BLD AUTO: 52.1 % — SIGNIFICANT CHANGE UP (ref 43–77)
PLATELET # BLD AUTO: 281 K/UL — SIGNIFICANT CHANGE UP (ref 150–400)
RBC # BLD: 4.51 M/UL — SIGNIFICANT CHANGE UP (ref 3.8–5.2)
RBC # FLD: 13.7 % — SIGNIFICANT CHANGE UP (ref 10.3–14.5)
WBC # BLD: 9 K/UL — SIGNIFICANT CHANGE UP (ref 3.8–10.5)
WBC # FLD AUTO: 9 K/UL — SIGNIFICANT CHANGE UP (ref 3.8–10.5)

## 2019-08-01 PROCEDURE — 99215 OFFICE O/P EST HI 40 MIN: CPT

## 2019-08-01 NOTE — REASON FOR VISIT
[Follow-Up Visit] : a follow-up visit for [Monoclonal Gammopathy] : monoclonal gammopathy [Spouse] : spouse [Family Member] : family member

## 2019-08-01 NOTE — REVIEW OF SYSTEMS
[Joint Pain] : joint pain [Joint Stiffness] : joint stiffness [Muscle Pain] : no muscle pain [Muscle Weakness] : no muscle weakness [FreeTextEntry9] : R lower back and L achilles heel [Negative] : Allergic/Immunologic

## 2019-08-01 NOTE — CONSULT LETTER
[Dear  ___] : Dear  [unfilled], [Courtesy Letter:] : I had the pleasure of seeing your patient, [unfilled], in my office today. [Please see my note below.] : Please see my note below. [Sincerely,] : Sincerely, [Consult Closing:] : Thank you very much for allowing me to participate in the care of this patient.  If you have any questions, please do not hesitate to contact me. [FreeTextEntry3] : Rosa Suazo MD. MS. \par Hematologist/Oncologist\par [facility]\par ph. [phone]\par fx. [phone]\par  [DrJonathan  ___] : Dr. COLINDRES

## 2019-08-01 NOTE — ASSESSMENT
[FreeTextEntry1] : IgA Kappa MGUS\par BMBx c/w 9% plasmacytosis\par No e/o end organ damage\par PET/CT no increased avidity, soft tissue avidity R trochanter c/w enthesophyte

## 2019-08-01 NOTE — PHYSICAL EXAM
[Restricted in physically strenuous activity but ambulatory and able to carry out work of a light or sedentary nature] : Status 1- Restricted in physically strenuous activity but ambulatory and able to carry out work of a light or sedentary nature, e.g., light house work, office work [Obese] : obese [de-identified] : ttp of L achilles heel [Normal] : affect appropriate

## 2019-08-01 NOTE — HISTORY OF PRESENT ILLNESS
[Disease:__________________________] : Disease: [unfilled] [de-identified] : July 22, 2019\par "65 yo female with a recent hx of severe R pelvic pain seen by ortho in June and had MRI done of L-S spine done June 3rd showed S3 soft tissue lesion with epidural extension concerning for possible neoplasm or mets, CT imaging on June 8th showed S3 lytic lesion in vertebral body, lucencies in T2, T7, T11 possibly representing hemangiomas vs lytic lesions. Pt had biopsy done by IR on July 3rd, 2019, which was nondiagnostic. Showed polytypic plasma cells, comprising less than 5% of the cells. The bone marrow showed normal trilineage hematopoesis. Additional kappa and lambda restriction is in process. The pt was referred for hematology evaluation for concern of multiple myeloma. She is accompanied with  and dtr to the office visit. She was also advised to see Dr Hart for BMT and RadOnc (Dr Hdez).\par Pt reports marked improvement in R pelvic pain, now able to walk and move w/o significant discomfort. No intervention has been done for S3 lesion as yet. She has no hx of anemia or renal insufficiency or other areas of bone pain. She has no hx of weight loss or fatigue. " [de-identified] : Pt and family are here for f/u of results and further recommendations. She has no new complaints today.

## 2019-08-02 ENCOUNTER — OTHER (OUTPATIENT)
Age: 64
End: 2019-08-02

## 2019-08-05 LAB — CHROM ANALY INTERPHASE BLD FISH-IMP: SIGNIFICANT CHANGE UP

## 2019-08-08 LAB — CHROM ANALY OVERALL INTERP SPEC-IMP: SIGNIFICANT CHANGE UP

## 2019-08-20 ENCOUNTER — MESSAGE (OUTPATIENT)
Age: 64
End: 2019-08-20

## 2019-08-21 ENCOUNTER — APPOINTMENT (OUTPATIENT)
Dept: HEMATOLOGY ONCOLOGY | Facility: CLINIC | Age: 64
End: 2019-08-21

## 2019-08-21 ENCOUNTER — MESSAGE (OUTPATIENT)
Age: 64
End: 2019-08-21

## 2019-10-24 ENCOUNTER — OUTPATIENT (OUTPATIENT)
Dept: OUTPATIENT SERVICES | Facility: HOSPITAL | Age: 64
LOS: 1 days | Discharge: HOME | End: 2019-10-24

## 2019-10-24 DIAGNOSIS — C90.00 MULTIPLE MYELOMA NOT HAVING ACHIEVED REMISSION: ICD-10-CM

## 2019-11-08 ENCOUNTER — RESULT REVIEW (OUTPATIENT)
Age: 64
End: 2019-11-08

## 2019-11-08 ENCOUNTER — APPOINTMENT (OUTPATIENT)
Dept: HEMATOLOGY ONCOLOGY | Facility: CLINIC | Age: 64
End: 2019-11-08

## 2019-11-08 LAB
BASOPHILS # BLD AUTO: 0.1 K/UL — SIGNIFICANT CHANGE UP (ref 0–0.2)
BASOPHILS NFR BLD AUTO: 0.8 % — SIGNIFICANT CHANGE UP (ref 0–2)
EOSINOPHIL # BLD AUTO: 0.3 K/UL — SIGNIFICANT CHANGE UP (ref 0–0.5)
EOSINOPHIL NFR BLD AUTO: 2.9 % — SIGNIFICANT CHANGE UP (ref 0–6)
HCT VFR BLD CALC: 42.6 % — SIGNIFICANT CHANGE UP (ref 34.5–45)
HGB BLD-MCNC: 14.4 G/DL — SIGNIFICANT CHANGE UP (ref 11.5–15.5)
LYMPHOCYTES # BLD AUTO: 3.1 K/UL — SIGNIFICANT CHANGE UP (ref 1–3.3)
LYMPHOCYTES # BLD AUTO: 31.6 % — SIGNIFICANT CHANGE UP (ref 13–44)
MCHC RBC-ENTMCNC: 30.5 PG — SIGNIFICANT CHANGE UP (ref 27–34)
MCHC RBC-ENTMCNC: 33.8 G/DL — SIGNIFICANT CHANGE UP (ref 32–36)
MCV RBC AUTO: 90.3 FL — SIGNIFICANT CHANGE UP (ref 80–100)
MONOCYTES # BLD AUTO: 0.5 K/UL — SIGNIFICANT CHANGE UP (ref 0–0.9)
MONOCYTES NFR BLD AUTO: 5.6 % — SIGNIFICANT CHANGE UP (ref 2–14)
NEUTROPHILS # BLD AUTO: 5.8 K/UL — SIGNIFICANT CHANGE UP (ref 1.8–7.4)
NEUTROPHILS NFR BLD AUTO: 59.1 % — SIGNIFICANT CHANGE UP (ref 43–77)
PLATELET # BLD AUTO: 290 K/UL — SIGNIFICANT CHANGE UP (ref 150–400)
RBC # BLD: 4.72 M/UL — SIGNIFICANT CHANGE UP (ref 3.8–5.2)
RBC # FLD: 12.6 % — SIGNIFICANT CHANGE UP (ref 10.3–14.5)
WBC # BLD: 9.8 K/UL — SIGNIFICANT CHANGE UP (ref 3.8–10.5)
WBC # FLD AUTO: 9.8 K/UL — SIGNIFICANT CHANGE UP (ref 3.8–10.5)

## 2019-11-11 LAB
ALBUMIN MFR SERPL ELPH: 58.2 %
ALBUMIN SERPL ELPH-MCNC: 4.5 G/DL
ALBUMIN SERPL-MCNC: 4.1 G/DL
ALBUMIN/GLOB SERPL: 1.4 RATIO
ALP BLD-CCNC: 98 U/L
ALPHA1 GLOB MFR SERPL ELPH: 4.3 %
ALPHA1 GLOB SERPL ELPH-MCNC: 0.3 G/DL
ALPHA2 GLOB MFR SERPL ELPH: 11.6 %
ALPHA2 GLOB SERPL ELPH-MCNC: 0.8 G/DL
ALT SERPL-CCNC: 54 U/L
ANION GAP SERPL CALC-SCNC: 12 MMOL/L
AST SERPL-CCNC: 33 U/L
B-GLOBULIN MFR SERPL ELPH: 18.2 %
B-GLOBULIN SERPL ELPH-MCNC: 1.3 G/DL
BILIRUB SERPL-MCNC: 0.3 MG/DL
BUN SERPL-MCNC: 12 MG/DL
CALCIUM SERPL-MCNC: 9.9 MG/DL
CHLORIDE SERPL-SCNC: 103 MMOL/L
CO2 SERPL-SCNC: 26 MMOL/L
CREAT SERPL-MCNC: 0.82 MG/DL
DEPRECATED KAPPA LC FREE/LAMBDA SER: 1.57 RATIO
DEPRECATED KAPPA LC FREE/LAMBDA SER: 1.57 RATIO
GAMMA GLOB FLD ELPH-MCNC: 0.5 G/DL
GAMMA GLOB MFR SERPL ELPH: 7.7 %
GLUCOSE SERPL-MCNC: 96 MG/DL
IGA SER QL IEP: 629 MG/DL
IGG SER QL IEP: 574 MG/DL
IGM SER QL IEP: 42 MG/DL
INTERPRETATION SERPL IEP-IMP: NORMAL
KAPPA LC CSF-MCNC: 1.36 MG/DL
KAPPA LC CSF-MCNC: 1.36 MG/DL
KAPPA LC SERPL-MCNC: 2.13 MG/DL
KAPPA LC SERPL-MCNC: 2.13 MG/DL
M PROTEIN MFR SERPL ELPH: 6.7 %
M PROTEIN SPEC IFE-MCNC: NORMAL
MONOCLON BAND OBS SERPL: 0.5 G/DL
POTASSIUM SERPL-SCNC: 4.9 MMOL/L
PROT SERPL-MCNC: 7 G/DL
SODIUM SERPL-SCNC: 141 MMOL/L

## 2019-11-18 ENCOUNTER — APPOINTMENT (OUTPATIENT)
Dept: HEMATOLOGY ONCOLOGY | Facility: CLINIC | Age: 64
End: 2019-11-18
Payer: MEDICARE

## 2019-11-18 VITALS
HEART RATE: 66 BPM | TEMPERATURE: 98.9 F | DIASTOLIC BLOOD PRESSURE: 70 MMHG | WEIGHT: 179.68 LBS | OXYGEN SATURATION: 99 % | SYSTOLIC BLOOD PRESSURE: 130 MMHG | RESPIRATION RATE: 14 BRPM | BODY MASS INDEX: 29.9 KG/M2

## 2019-11-18 PROCEDURE — 99215 OFFICE O/P EST HI 40 MIN: CPT

## 2019-11-20 NOTE — HISTORY OF PRESENT ILLNESS
[Disease:__________________________] : Disease: [unfilled] [Treatment Protocol] : Treatment Protocol [de-identified] : July 22, 2019\par "63 yo female with a recent hx of severe R pelvic pain seen by ortho in June and had MRI done of L-S spine done June 3rd showed S3 soft tissue lesion with epidural extension concerning for possible neoplasm or mets, CT imaging on June 8th showed S3 lytic lesion in vertebral body, lucencies in T2, T7, T11 possibly representing hemangiomas vs lytic lesions. Pt had biopsy done by IR on July 3rd, 2019, which was nondiagnostic. Showed polytypic plasma cells, comprising less than 5% of the cells. The bone marrow showed normal trilineage hematopoesis. Additional kappa and lambda restriction is in process. The pt was referred for hematology evaluation for concern of multiple myeloma. She is accompanied with  and dtr to the office visit. She was also advised to see Dr Hart for BMT and RadOnc (Dr Hdez).\par Pt reports marked improvement in R pelvic pain, now able to walk and move w/o significant discomfort. No intervention has been done for S3 lesion as yet. She has no hx of anemia or renal insufficiency or other areas of bone pain. She has no hx of weight loss or fatigue. " [de-identified] : int risk dz [de-identified] : \par  Pathology             Final\par \par No Documents Attached\par \par \par \par \par   [facility] Accession Number : [id number]\par \par JAENS MOTA 2\par \par \par \par Cytopathology Report\par \par \par \par \par \par Final Diagnosis\par BONE, VERTEBRAL BODY, S3, CT GUIDED CORE BIOPSY AND FNA\par Cellular marrow with maturing trilineage hematopoiesis and mild\par plasmacytosis\par See note and description.\par \par Diagnostic note:  Per chart review, patient has lytic lesions in\par vertebral body S3. The current biopsy consists of fragmented\par marrow showing cellular marrow with maturing trilineage\par hematopoiesis and mild plasmacytosis. The clonality of the plasma\par cell population cannot be determined by kappaISH and lambdaISH\par studies. Immunohistochemistry work up for light chain restriction\par are in proces and will be reported as an addendum. Correlation\par with clinical findings (e.g.: serum immunoglobulin levels, SPEP,\par IF studies) and if clinically indicated a bone marrow biopsy with\par flow cytometry and cytogenetic analysis is recommended.\par \par Microscopic description:   Cytology slides, touch preps, cell\par block and histologic sections of core biopsy consists of small\par fragments of marrow elements with 40-50% cellularity showing\par maturing trilineage hematopoiesis with mild plasmacytosis. Plasma\par cells are mostly distributed perivascularly and some\par interstitially, occasional small groups are seen.\par \par Immunohistochemistry:  CD20, CD3, CD34, , AE1-3, ,\par KappaISH, lambdaISH, kappa and lambda, MPO, CD71, E-cad, CD15\par stains  performed on formalin fixed paraffin embedded tissue,\par block 1B.\par \par CD34: Highlights vascular structures, no increase in blast\par population\par : Highlights erythroid series and scattered masts cells. No\par increase in blast population\par CD20: Highlights small B cells scattered interstitially\par CD3: Highlights small T cells scattered interstitially\par AE1-3: Negative\par \par  stain highlights plasma cells forming small clusters,\par comprising less than 5% marrow cellularity. KappaISH and\par lambdaISH stains are non-contributory.\par \par Kappa and lambda light chain immunostains are in process and will\par be reported as an addendum.\par \par MPO, CD71, E-cad and CD15 immunostains are in process and will be\par reported as an addendum.\par \par \par \par \par \par JANES MOTA                          2\par \par \par \par Cytopathology Report\par \par \par \par \par \par Flow cytometry analysis:  Not submitted\par \par Screened by: Christina MERCER (ASCP)\par Verified by: Vicki Prajapati MD\par (Electronic Signature)\par Reported on: 07/10/19 12:55 EDT, 6 [facility], [place], [place]\par [address]\par Cytology technical processing performed at [address] [place] [id number]\par _________________________________________________________________\par \par \par Specimen(s) Submitted\par BONE, VERTEBRAL BODY, S3, CT GUIDED CORE BIOPSY AND FNA\par \par \par Clinical Information\par Spinal cord lesion. Rule out cancer. No history of cancer. Rule\par out cordoma.\par \par CT Chest, abdomen and pelvis (06/08/2019): Lytic lesions of S3\par vertebral body, lucency in posterior T 1,  anterior T7, and\par anterior T2 vertebral bodies are indeterminate, possible\par representing small hemangiomas versus additional lytic lesions\par \par \par Gross Description\par Core Biopsy:\par Tissue collected: Specimen container has been inspected to\par confirm patient?s name and date of birth, contains multiple tan\par cores measuring 0.3 - 1.0 cm in length (and multiple fragments).\par Entire specimen submitted in 2 cassette(s) labeled 1B and 1C.\par \par 1 Touch prep slides ( 1 air dried + 0 fixed)\par \par FNA:\par Received: 4 air dried and 4 fixed slides\par Needle rinses in 20 ml formalin\par Submitted: cell block in one cassette labeled 1A\par \par Prepared:\par 1 Touch Prep, 4 Diff Quick,  4 Pap Stained slides\par 1 cell block  labeled 1A\par 1 core biopsy labeled 1B\par 1 core biopsy labeled 1C\par 12 Total slides\par \par  \par \par  Ordered by: DOCUMENT, FRANKLIN       Collected/Examined: [id number] 03:11PM       \par Verification Not Required       Stage: Final       \par  Performed at: [facility]       Resulted: 10Jul2019 12:55PM       Last Updated: 10Jul2019 12:56PM       Accession: [id number]       \par  Pathology             Final\par \par No Documents Attached\par \par \par \par \par   [facility] Accession Number : [id number]\par \par JANES MOTA 2\par \par \par \par Cytopathology Report\par \par \par \par \par \par Final Diagnosis\par BONE, VERTEBRAL BODY, S3, CT GUIDED CORE BIOPSY AND FNA\par Cellular marrow with maturing trilineage hematopoiesis and mild\par plasmacytosis\par See note and description.\par \par Diagnostic note:  Per chart review, patient has lytic lesions in\par vertebral body S3. The current biopsy consists of fragmented\par marrow showing cellular marrow with maturing trilineage\par hematopoiesis and mild plasmacytosis. The clonality of the plasma\par cell population cannot be determined by kappaISH and lambdaISH\par studies. Immunohistochemistry work up for light chain restriction\par are in proces and will be reported as an addendum. Correlation\par with clinical findings (e.g.: serum immunoglobulin levels, SPEP,\par IF studies) and if clinically indicated a bone marrow biopsy with\par flow cytometry and cytogenetic analysis is recommended.\par \par Microscopic description:   Cytology slides, touch preps, cell\par block and histologic sections of core biopsy consists of small\par fragments of marrow elements with 40-50% cellularity showing\par maturing trilineage hematopoiesis with mild plasmacytosis. Plasma\par cells are mostly distributed perivascularly and some\par interstitially, occasional small groups are seen.\par \par Immunohistochemistry:  CD20, CD3, CD34, , AE1-3, ,\par KappaISH, lambdaISH, kappa and lambda, MPO, CD71, E-cad, CD15\par stains  performed on formalin fixed paraffin embedded tissue,\par block 1B.\par \par CD34: Highlights vascular structures, no increase in blast\par population\par : Highlights erythroid series and scattered masts cells. No\par increase in blast population\par CD20: Highlights small B cells scattered interstitially\par CD3: Highlights small T cells scattered interstitially\par AE1-3: Negative\par \par  stain highlights plasma cells forming small clusters,\par comprising less than 5% marrow cellularity. KappaISH and\par lambdaISH stains are non-contributory.\par \par Kappa and lambda light chain immunostains are in process and will\par be reported as an addendum.\par \par MPO, CD71, E-cad and CD15 immunostains are in process and will be\par reported as an addendum.\par \par \par \par \par \par JANES MOTA                          2\par \par \par \par Cytopathology Report\par \par \par \par \par \par Flow cytometry analysis:  Not submitted\par \par Screened by: Christina MERCER (ASCP)\par Verified by: Vicki Prajapati MD\par (Electronic Signature)\par Reported on: 07/10/19 12:55 EDT, [facility], [place], [place]\par [address]\par Cytology technical processing performed at [address] [place] [id number]\par _________________________________________________________________\par \par \par Specimen(s) Submitted\par BONE, VERTEBRAL BODY, S3, CT GUIDED CORE BIOPSY AND FNA\par \par \par Clinical Information\par Spinal cord lesion. Rule out cancer. No history of cancer. Rule\par out cordoma.\par \par CT Chest, abdomen and pelvis (06/08/2019): Lytic lesions of S3\par vertebral body, lucency in posterior T 1,  anterior T7, and\par anterior T2 vertebral bodies are indeterminate, possible\par representing small hemangiomas versus additional lytic lesions\par \par \par Gross Description\par Core Biopsy:\par Tissue collected: Specimen container has been inspected to\par confirm patient?s name and date of birth, contains multiple tan\par cores measuring 0.3 - 1.0 cm in length (and multiple fragments).\par Entire specimen submitted in 2 cassette(s) labeled 1B and 1C.\par \par 1 Touch prep slides ( 1 air dried + 0 fixed)\par \par FNA:\par Received: 4 air dried and 4 fixed slides\par Needle rinses in 20 ml formalin\par Submitted: cell block in one cassette labeled 1A\par \par Prepared:\par 1 Touch Prep, 4 Diff Quick,  4 Pap Stained slides\par 1 cell block  labeled 1A\par 1 core biopsy labeled 1B\par 1 core biopsy labeled 1C\par 12 Total slides\par \par  \par \par  Ordered by: DOCUMENT, SCM       Collected/Examined: [id number] 03:11PM       \par Verification Not Required       Stage: Final       \par  Performed at: [facility]       Resulted: [id number] 12:55PM       Last Updated: [id number] 12:56PM       Accession: [id number]        [FreeTextEntry1] : surveillance with labs and imaging [de-identified] : Aug 1, 2019\christin Pt and family are here for f/u of results and further recommendations. She has no new complaints today.\par \par -----------------------------------------------\christin Pt is here with family today for routine f/u. Labs were done 2 weeks ago. She is c/o new L sided pelvic/hip pain with limitation in mobility. Her weight and appetite are stable. Otherwise no acute complaints.

## 2019-11-20 NOTE — REASON FOR VISIT
[Follow-Up Visit] : a follow-up visit for [Spouse] : spouse [Family Member] : family member [FreeTextEntry2] : IgA MGUS

## 2019-11-20 NOTE — RESULTS/DATA
[FreeTextEntry1] : \par  Pathology             Final\par \par No Documents Attached\par \par \par \par \par   [facility] Accession Number : [id number]\par \par JANES MOTA                          3\par \par \par \par Hematopathology Report\par \par \par \par \par Final Diagnosis\par 1, 2. Bone marrow biopsy and bone marrow aspirate\par - Trilineage hematopoiesis with maturation and iron stores\par present\par - Monotypic plasma cells present\par - Negative for Congo red stains\par \par See note and description.\par \par Diagnostic note:\par Monotypic plasma cells are present (plasma cell count: < 10% by\par  stain, 0.9% by flow cytometry,  9% by smear count).\par Correlation with relevant clinical, laboratory and radiologic\par imaging is suggested. Comprehensive report with results of\par pending ancillary studies to follow.\par \par Dr. ISAIAH Suazo was notified of the diagnosis on 07/31/19\par 13:43.\par \par Ancillary studies\par Bone marrow aspirate iron stain: Iron stores are present; no ring\par sideroblasts are seen.\par Congo red stains (block 1A and 1B) are negative for amyloidosis.\par \par Flow cytometry:  Monotypic plasma cells (0.9% of cells), positive\par for cytoplasmic kappa, CD38, , dimmer CD45, ; negative\par CD19, CD20, CD56.\par Lymphocytes (19% of cells): Heterogeneous population of T-cells\par (with normal CD4 to CD8 ratio, including few natural killer-like\par T-cells), natural killer cells, and polytypic B-cells.\par Hematogones are present.\par \par Immunohistochemical stains:   stains (blocks 1A and 1B) show\par approximately 5 to 9% plasma cells with cyclin-D1 positivity. In-\par situ hybridization studies (block 1A) for cytoplasmic kappa and\par lambda show kappa staining restriction.\par \par Microscopic description:\par 1. Biopsy: Sections of bone marrow biopsy show normocellularity\par (50 to 55% cellularity), trilineage hematopoiesis with\par maturation, megakaryocytes normal in number and morphology, mild\par plasmacytosis and iron stores present.\par \par 2. Aspirate: Cellular spicules are present, adequate for\par interpretation.  Maturing and mature myeloid and erythroid\par elements are present with normal M:E ratio.  Megakaryocytes\par appear normal in number and morphology.\par \par \par \par \par \par \par JANES MOTA                          3\par \par \par \par Hematopathology Report\par \par \par \par \par Bone Marrow Aspirate Differential: (200 Cells).\par Type            %    Normal*\par Blast                1%   0-3\par Neutrophil and\par Precursors        52%  47-74\par Eosinophil           4%   1-3\par Basophil        1%   0-1\par Pronormoblast        2%   0-2\par Normoblast           19%  15-25\par Monocyte        2%   0-1\par Lymphocyte           10%  5-15\par Plasma cell          9%   0-2\par *Adult Range\par \par Comment:  Iron stain (examined to evaluate for iron stores; see\par microscopic description) and Giemsa stain (shows appropriate\par staining pattern) are performed and evaluated on block(s): 1A,\par 1B.\par \par Built in immunohistochemical study control(s) associated with\par this case have been verified by the sign out pathologist.\par These immunohistochemical/ in-situ hybridization tests have been\par developed and their performance characteristics determined by\par [facility] / [facility], Department of\par Pathology, Division of Immunopathology [facility]\par Center, [address] [place] [address].  It has not\par been cleared or approved by the U.S. Food and Drug\par Administration.  The FDA has determined that such clearance or\par approval is not necessary.  This test is used for clinical\par purposes.  The laboratory is certified under the\par CLIA-88 as qualified to perform high complexity clinical testing.\par \par Verified by: Jaime Ramey M.D.\par (Electronic Signature)\par Reported on: 07/31/19 13:49 EDT, [address] [place] [address]\par [id number]\par _________________________________________________________________\par \par Clinical History\par Rule out MM, amyloid congo red please\par CBC on 7/22/2019: WBC: 7.5 K/uL, HGB: 13.6 g/dL, MCV: 91.0 fl,\par Plt: 254 K/uL\par SPEP showed M-spike 0.5 g/dL, immunofixation of serum showed: IgA\par kappa\par \par Specimen(s) Submitted\par 1     Bone marrow biopsy\par 2     Bone marrow aspirate\par \par \par \par \par \par NAKUL JANES                          3\par \par \par \par Hematopathology Report\par \par \par \par \par \par Gross Description\par 1. The specimen is received in bouin's fixative and the specimen\par container is labeled: Right PIC bone marrow biopsy.  It consists\par of a 1.1 cm in length x 0.2 cm in diameter bone marrow core with\par a 3.0 x 2.8 x 0.4 cm aggregate of blood clot. Special stains are\par requested. Entirely submitted.  Two cassettes: 1A = bone marrow\par core; 1B = blood clot.\par \par 2. Two Lind-Giemsa stained bone marrow aspirate smears are\par submitted [10-FL-, ].\par \par In addition to other data that may appear on the specimen\par container, the label has been inspected to confirm the presence\par of the patient's name and date of birth.\par \par Sandrine Valencia 07/24/2019 16:09\par \par  \par \par  Ordered by: AME SUAZO       Collected/Examined: [id number] 10:45AM       \par Verified by: AME SUAZO [id number] 04:59PM       \par  Result Communication: No patient communication needed at this time;\par Stage: Final       \par  Performed at: [facility]       Resulted: [id number] 01:49PM       Last Updated: 31Jul2019 04:59PM       Accession: [id number]      \par \par \par  Pathology             Final\par \par No Documents Attached\par \par \par \par \par   [facility] Accession Number : [id number]\par \par JANES MOTA 2\par \par \par \par Cytopathology Report\par \par \par \par \par \par Final Diagnosis\par BONE, VERTEBRAL BODY, S3, CT GUIDED CORE BIOPSY AND FNA\par Cellular marrow with maturing trilineage hematopoiesis and mild\par plasmacytosis\par See note and description.\par \par Diagnostic note:  Per chart review, patient has lytic lesions in\par vertebral body S3. The current biopsy consists of fragmented\par marrow showing cellular marrow with maturing trilineage\par hematopoiesis and mild plasmacytosis. The clonality of the plasma\par cell population cannot be determined by kappaISH and lambdaISH\par studies. Immunohistochemistry work up for light chain restriction\par are in proces and will be reported as an addendum. Correlation\par with clinical findings (e.g.: serum immunoglobulin levels, SPEP,\par IF studies) and if clinically indicated a bone marrow biopsy with\par flow cytometry and cytogenetic analysis is recommended.\par \par Microscopic description:   Cytology slides, touch preps, cell\par block and histologic sections of core biopsy consists of small\par fragments of marrow elements with 40-50% cellularity showing\par maturing trilineage hematopoiesis with mild plasmacytosis. Plasma\par cells are mostly distributed perivascularly and some\par interstitially, occasional small groups are seen.\par \par Immunohistochemistry:  CD20, CD3, CD34, , AE1-3, ,\par KappaISH, lambdaISH, kappa and lambda, MPO, CD71, E-cad, CD15\par stains  performed on formalin fixed paraffin embedded tissue,\par block 1B.\par \par CD34: Highlights vascular structures, no increase in blast\par population\par : Highlights erythroid series and scattered masts cells. No\par increase in blast population\par CD20: Highlights small B cells scattered interstitially\par CD3: Highlights small T cells scattered interstitially\par AE1-3: Negative\par \par  stain highlights plasma cells forming small clusters,\par comprising less than 5% marrow cellularity. KappaISH and\par lambdaISH stains are non-contributory.\par \par Kappa and lambda light chain immunostains are in process and will\par be reported as an addendum.\par \par MPO, CD71, E-cad and CD15 immunostains are in process and will be\par reported as an addendum.\par \par \par \par \par \par JANES MOTA                          2\par \par \par \par Cytopathology Report\par \par \par \par \par \par Flow cytometry analysis:  Not submitted\par \par Screened by: Christina Urena CT (ASCP)\par Verified by: Vicki Prajapati MD\par (Electronic Signature)\par Reported on: 07/10/19 12:55 EDT, 6 [facility], [place], [place]\par [address]\par Cytology technical processing performed at [address] [place] [id number]\par _________________________________________________________________\par \par \par Specimen(s) Submitted\par BONE, VERTEBRAL BODY, S3, CT GUIDED CORE BIOPSY AND FNA\par \par \par Clinical Information\par Spinal cord lesion. Rule out cancer. No history of cancer. Rule\par out cordoma.\par \par CT Chest, abdomen and pelvis (06/08/2019): Lytic lesions of S3\par vertebral body, lucency in posterior T 1,  anterior T7, and\par anterior T2 vertebral bodies are indeterminate, possible\par representing small hemangiomas versus additional lytic lesions\par \par \par Gross Description\par Core Biopsy:\par Tissue collected: Specimen container has been inspected to\par confirm patient?s name and date of birth, contains multiple tan\par cores measuring 0.3 - 1.0 cm in length (and multiple fragments).\par Entire specimen submitted in 2 cassette(s) labeled 1B and 1C.\par \par 1 Touch prep slides ( 1 air dried + 0 fixed)\par \par FNA:\par Received: 4 air dried and 4 fixed slides\par Needle rinses in 20 ml formalin\par Submitted: cell block in one cassette labeled 1A\par \par Prepared:\par 1 Touch Prep, 4 Diff Quick,  4 Pap Stained slides\par 1 cell block  labeled 1A\par 1 core biopsy labeled 1B\par 1 core biopsy labeled 1C\par 12 Total slides\par \par  \par \par  Ordered by: DOCUMENT, FRANKLIN       Collected/Examined: 02Jul2019 03:11PM       \par Verification Not Required       Stage: Final       \par  Performed at: [facility]       Resulted: 10Jul2019 12:55PM       Last Updated: 10Jul2019 12:56PM       Accession: [id number]        \par \par \par  Pathology             Final\par \par No Documents Attached\par \par \par \par \par   [facility] Accession Number : [id number]\par \par JANES MOTA                          3\par \par \par \par Cytopathology Addendum Report\par \par \par \par \par Addendum\par For reporting additional immunostain results\par \par Immunohistochemistry:\par Kappa and lambda light chain stains looks polyclonal in this\par biopsy with high background staining.\par CD71: Highlights erythroid series\par E-cad: Negative\par MPO: Highlights myeloid series\par CD15: Highlights maturing myeloid series\par \par The diagnosis remains unchanged.\par \par Verified by: Vicki Prajapati MD\par (Electronic Signature)\par Reported on: 07/15/19 15:10 EDT, 6 [facility], [place], [place]\par [address]\par _________________________________________________________________\par \par \par Cytopathology Report\par \par \par \par \par \par Final Diagnosis\par BONE, VERTEBRAL BODY, S3, CT GUIDED CORE BIOPSY AND FNA\par Cellular marrow with maturing trilineage hematopoiesis and mild\par plasmacytosis\par See note and description.\par \par Diagnostic note:  Per chart review, patient has lytic lesions in\par vertebral body S3. The current biopsy consists of fragmented\par marrow showing cellular marrow with maturing trilineage\par hematopoiesis and mild plasmacytosis. The clonality of the plasma\par cell population cannot be determined by kappaISH and lambdaISH\par studies. Immunohistochemistry work up for light chain restriction\par are in proces and will be reported as an addendum. Correlation\par with clinical findings (e.g.: serum immunoglobulin levels, SPEP,\par IF studies) and if clinically indicated a bone marrow biopsy with\par flow cytometry and cytogenetic analysis is recommended.\par \par Microscopic description:   Cytology slides, touch preps, cell\par block and histologic sections of core biopsy consists of small\par fragments of marrow elements with 40-50% cellularity showing\par maturing trilineage hematopoiesis with mild\par \par \par \par \par \par JANES MOTA                          3\par \par \par \par Cytopathology Report\par \par \par \par \par plasmacytosis. Plasma cells are mostly distributed perivascularly\par and some interstitially, occasional small groups are seen.\par \par Immunohistochemistry:  CD20, CD3, CD34, , AE1-3, ,\par KappaISH, lambdaISH, kappa and lambda, MPO, CD71, E-cad, CD15\par stains  performed on formalin fixed paraffin embedded tissue,\par block 1B.\par \par CD34: Highlights vascular structures, no increase in blast\par population\par : Highlights erythroid series and scattered masts cells. No\par increase in blast population\par CD20: Highlights small B cells scattered interstitially\par CD3: Highlights small T cells scattered interstitially\par AE1-3: Negative\par \par  stain highlights plasma cells forming small clusters,\par comprising less than 5% marrow cellularity. KappaISH and\par lambdaISH stains are non-contributory.\par \par Kappa and lambda light chain immunostains are in process and will\par be reported as an addendum.\par \par MPO, CD71, E-cad and CD15 immunostains are in process and will be\par reported as an addendum.\par \par Flow cytometry analysis:  Not submitted\par \par Screened by: Christina MERCER (ASCP)\par Verified by: Vicki Prajapati MD\par (Electronic Signature)\par Reported on: 07/10/19 12:55 EDT, 6 [facility], [place], [place]\par [address]\par Cytology technical processing performed at [address] [place] [id number]\par _________________________________________________________________\par \par \par Specimen(s) Submitted\par BONE, VERTEBRAL BODY, S3, CT GUIDED CORE BIOPSY AND FNA\par \par \par Clinical Information\par Spinal cord lesion. Rule out cancer. No history of cancer. Rule\par out cordoma.\par \par \par \par \par \par \par \par JANES MOTA                          3\par \par \par \par Cytopathology Report\par \par \par \par \par CT Chest, abdomen and pelvis (06/08/2019): Lytic lesions of S3\par vertebral body, lucency in posterior T 1,  anterior T7, and\par anterior T2 vertebral bodies are indeterminate, possible\par representing small hemangiomas versus additional lytic lesions\par \par \par Gross Description\par Core Biopsy:\par Tissue collected: Specimen container has been inspected to\par confirm patient?s name and date of birth, contains multiple tan\par cores measuring 0.3 - 1.0 cm in length (and multiple fragments).\par Entire specimen submitted in 2 cassette(s) labeled 1B and 1C.\par \par 1 Touch prep slides ( 1 air dried + 0 fixed)\par \par FNA:\par Received: 4 air dried and 4 fixed slides\par Needle rinses in 20 ml formalin\par Submitted: cell block in one cassette labeled 1A\par \par Prepared:\par 1 Touch Prep, 4 Diff Quick,  4 Pap Stained slides\par 1 cell block  labeled 1A\par 1 core biopsy labeled 1B\par 1 core biopsy labeled 1C\par 12 Total slides\par \par  \par \par  Ordered by: DOCUMENT, SCM       Collected/Examined: 02Jul2019 03:11PM       \par Verification Not Required       Stage: Final       \par  Performed at: [facility]       Resulted: [id number] 03:10PM       Last Updated: 15Jul2019 03:10PM       Accession: [id number]

## 2019-11-20 NOTE — REVIEW OF SYSTEMS
[Joint Pain] : joint pain [Muscle Weakness] : muscle weakness [Negative] : Allergic/Immunologic [Fatigue] : no fatigue

## 2019-11-20 NOTE — CONSULT LETTER
[Dear  ___] : Dear  [unfilled], [Courtesy Letter:] : I had the pleasure of seeing your patient, [unfilled], in my office today. [Please see my note below.] : Please see my note below. [Consult Closing:] : Thank you very much for allowing me to participate in the care of this patient.  If you have any questions, please do not hesitate to contact me. [Sincerely,] : Sincerely, [FreeTextEntry3] : Rosa Suazo MD. MS. \par Hematologist/Oncologist\par [facility]\par ph. [phone]\par fx. [phone]\par  [DrJonathan  ___] : Dr. COLINDRES

## 2019-11-28 ENCOUNTER — FORM ENCOUNTER (OUTPATIENT)
Age: 64
End: 2019-11-28

## 2019-11-29 ENCOUNTER — APPOINTMENT (OUTPATIENT)
Dept: MRI IMAGING | Facility: CLINIC | Age: 64
End: 2019-11-29
Payer: MEDICARE

## 2019-11-29 ENCOUNTER — OUTPATIENT (OUTPATIENT)
Dept: OUTPATIENT SERVICES | Facility: HOSPITAL | Age: 64
LOS: 1 days | End: 2019-11-29
Payer: MEDICARE

## 2019-11-29 DIAGNOSIS — D47.2 MONOCLONAL GAMMOPATHY: ICD-10-CM

## 2019-11-29 DIAGNOSIS — R22.2 LOCALIZED SWELLING, MASS AND LUMP, TRUNK: ICD-10-CM

## 2019-11-29 PROCEDURE — A9585: CPT

## 2019-11-29 PROCEDURE — 72158 MRI LUMBAR SPINE W/O & W/DYE: CPT | Mod: 26

## 2019-11-29 PROCEDURE — 72158 MRI LUMBAR SPINE W/O & W/DYE: CPT

## 2019-12-02 ENCOUNTER — MESSAGE (OUTPATIENT)
Age: 64
End: 2019-12-02

## 2020-01-07 ENCOUNTER — APPOINTMENT (OUTPATIENT)
Dept: ORTHOPEDIC SURGERY | Facility: CLINIC | Age: 65
End: 2020-01-07

## 2020-02-25 ENCOUNTER — OUTPATIENT (OUTPATIENT)
Dept: OUTPATIENT SERVICES | Facility: HOSPITAL | Age: 65
LOS: 1 days | Discharge: HOME | End: 2020-02-25

## 2020-02-25 DIAGNOSIS — C90.00 MULTIPLE MYELOMA NOT HAVING ACHIEVED REMISSION: ICD-10-CM

## 2020-02-27 ENCOUNTER — APPOINTMENT (OUTPATIENT)
Dept: HEMATOLOGY ONCOLOGY | Facility: CLINIC | Age: 65
End: 2020-02-27

## 2020-02-27 ENCOUNTER — RESULT REVIEW (OUTPATIENT)
Age: 65
End: 2020-02-27

## 2020-02-27 LAB
BASOPHILS # BLD AUTO: 0 K/UL — SIGNIFICANT CHANGE UP (ref 0–0.2)
BASOPHILS NFR BLD AUTO: 0.6 % — SIGNIFICANT CHANGE UP (ref 0–2)
EOSINOPHIL # BLD AUTO: 0.2 K/UL — SIGNIFICANT CHANGE UP (ref 0–0.5)
EOSINOPHIL NFR BLD AUTO: 2.7 % — SIGNIFICANT CHANGE UP (ref 0–6)
HCT VFR BLD CALC: 41.4 % — SIGNIFICANT CHANGE UP (ref 34.5–45)
HGB BLD-MCNC: 13.9 G/DL — SIGNIFICANT CHANGE UP (ref 11.5–15.5)
LYMPHOCYTES # BLD AUTO: 2.9 K/UL — SIGNIFICANT CHANGE UP (ref 1–3.3)
LYMPHOCYTES # BLD AUTO: 37.3 % — SIGNIFICANT CHANGE UP (ref 13–44)
MCHC RBC-ENTMCNC: 30.1 PG — SIGNIFICANT CHANGE UP (ref 27–34)
MCHC RBC-ENTMCNC: 33.5 G/DL — SIGNIFICANT CHANGE UP (ref 32–36)
MCV RBC AUTO: 89.8 FL — SIGNIFICANT CHANGE UP (ref 80–100)
MONOCYTES # BLD AUTO: 0.5 K/UL — SIGNIFICANT CHANGE UP (ref 0–0.9)
MONOCYTES NFR BLD AUTO: 6.4 % — SIGNIFICANT CHANGE UP (ref 2–14)
NEUTROPHILS # BLD AUTO: 4.2 K/UL — SIGNIFICANT CHANGE UP (ref 1.8–7.4)
NEUTROPHILS NFR BLD AUTO: 53 % — SIGNIFICANT CHANGE UP (ref 43–77)
PLATELET # BLD AUTO: 267 K/UL — SIGNIFICANT CHANGE UP (ref 150–400)
RBC # BLD: 4.61 M/UL — SIGNIFICANT CHANGE UP (ref 3.8–5.2)
RBC # FLD: 12.6 % — SIGNIFICANT CHANGE UP (ref 10.3–14.5)
WBC # BLD: 7.9 K/UL — SIGNIFICANT CHANGE UP (ref 3.8–10.5)
WBC # FLD AUTO: 7.9 K/UL — SIGNIFICANT CHANGE UP (ref 3.8–10.5)

## 2020-03-02 LAB
ALBUMIN MFR SERPL ELPH: 57.7 %
ALBUMIN SERPL ELPH-MCNC: 4.3 G/DL
ALBUMIN SERPL-MCNC: 3.9 G/DL
ALBUMIN/GLOB SERPL: 1.3 RATIO
ALP BLD-CCNC: 91 U/L
ALPHA1 GLOB MFR SERPL ELPH: 4.3 %
ALPHA1 GLOB SERPL ELPH-MCNC: 0.3 G/DL
ALPHA2 GLOB MFR SERPL ELPH: 11.6 %
ALPHA2 GLOB SERPL ELPH-MCNC: 0.8 G/DL
ALT SERPL-CCNC: 27 U/L
ANION GAP SERPL CALC-SCNC: 13 MMOL/L
AST SERPL-CCNC: 19 U/L
B-GLOBULIN MFR SERPL ELPH: 18.5 %
B-GLOBULIN SERPL ELPH-MCNC: 1.3 G/DL
BILIRUB SERPL-MCNC: 0.2 MG/DL
BUN SERPL-MCNC: 12 MG/DL
CALCIUM SERPL-MCNC: 9.4 MG/DL
CHLORIDE SERPL-SCNC: 105 MMOL/L
CO2 SERPL-SCNC: 24 MMOL/L
CREAT SERPL-MCNC: 0.71 MG/DL
DEPRECATED KAPPA LC FREE/LAMBDA SER: 2.03 RATIO
GAMMA GLOB FLD ELPH-MCNC: 0.5 G/DL
GAMMA GLOB MFR SERPL ELPH: 7.9 %
GLUCOSE SERPL-MCNC: 80 MG/DL
IGA SER QL IEP: 589 MG/DL
IGG SER QL IEP: 560 MG/DL
IGM SER QL IEP: 35 MG/DL
INTERPRETATION SERPL IEP-IMP: NORMAL
KAPPA LC CSF-MCNC: 1.05 MG/DL
KAPPA LC SERPL-MCNC: 2.13 MG/DL
LDH SERPL-CCNC: 165 U/L
M PROTEIN MFR SERPL ELPH: 7.9 %
M PROTEIN SPEC IFE-MCNC: NORMAL
MONOCLON BAND OBS SERPL: 0.5 G/DL
POTASSIUM SERPL-SCNC: 4.6 MMOL/L
PROT SERPL-MCNC: 6.8 G/DL
SODIUM SERPL-SCNC: 142 MMOL/L

## 2020-03-05 ENCOUNTER — APPOINTMENT (OUTPATIENT)
Dept: HEMATOLOGY ONCOLOGY | Facility: CLINIC | Age: 65
End: 2020-03-05
Payer: MEDICARE

## 2020-03-05 VITALS
TEMPERATURE: 98.7 F | WEIGHT: 183.87 LBS | OXYGEN SATURATION: 98 % | DIASTOLIC BLOOD PRESSURE: 81 MMHG | RESPIRATION RATE: 16 BRPM | SYSTOLIC BLOOD PRESSURE: 163 MMHG | HEART RATE: 54 BPM | BODY MASS INDEX: 30.6 KG/M2

## 2020-03-05 PROCEDURE — 99213 OFFICE O/P EST LOW 20 MIN: CPT

## 2020-03-06 NOTE — REASON FOR VISIT
[Follow-Up Visit] : a follow-up visit for [Spouse] : spouse [FreeTextEntry2] : MGUS, sacral mass ?etiology

## 2020-03-06 NOTE — HISTORY OF PRESENT ILLNESS
[Disease:__________________________] : Disease: [unfilled] [Treatment Protocol] : Treatment Protocol [de-identified] : July 22, 2019\par "63 yo female with a recent hx of severe R pelvic pain seen by ortho in June and had MRI done of L-S spine done June 3rd showed S3 soft tissue lesion with epidural extension concerning for possible neoplasm or mets, CT imaging on June 8th showed S3 lytic lesion in vertebral body, lucencies in T2, T7, T11 possibly representing hemangiomas vs lytic lesions. Pt had biopsy done by IR on July 3rd, 2019, which was nondiagnostic. Showed polytypic plasma cells, comprising less than 5% of the cells. The bone marrow showed normal trilineage hematopoesis. Additional kappa and lambda restriction is in process. The pt was referred for hematology evaluation for concern of multiple myeloma. She is accompanied with  and dtr to the office visit. She was also advised to see Dr Hart for BMT and RadOnc (Dr Hdez).\par Pt reports marked improvement in R pelvic pain, now able to walk and move w/o significant discomfort. No intervention has been done for S3 lesion as yet. She has no hx of anemia or renal insufficiency or other areas of bone pain. She has no hx of weight loss or fatigue. " [de-identified] : int risk dz [de-identified] : \par  Pathology             Final\par \par No Documents Attached\par \par \par \par \par   [facility] Accession Number : [id number]\par \par JANES MOTA 2\par \par \par \par Cytopathology Report\par \par \par \par \par \par Final Diagnosis\par BONE, VERTEBRAL BODY, S3, CT GUIDED CORE BIOPSY AND FNA\par Cellular marrow with maturing trilineage hematopoiesis and mild\par plasmacytosis\par See note and description.\par \par Diagnostic note:  Per chart review, patient has lytic lesions in\par vertebral body S3. The current biopsy consists of fragmented\par marrow showing cellular marrow with maturing trilineage\par hematopoiesis and mild plasmacytosis. The clonality of the plasma\par cell population cannot be determined by kappaISH and lambdaISH\par studies. Immunohistochemistry work up for light chain restriction\par are in proces and will be reported as an addendum. Correlation\par with clinical findings (e.g.: serum immunoglobulin levels, SPEP,\par IF studies) and if clinically indicated a bone marrow biopsy with\par flow cytometry and cytogenetic analysis is recommended.\par \par Microscopic description:   Cytology slides, touch preps, cell\par block and histologic sections of core biopsy consists of small\par fragments of marrow elements with 40-50% cellularity showing\par maturing trilineage hematopoiesis with mild plasmacytosis. Plasma\par cells are mostly distributed perivascularly and some\par interstitially, occasional small groups are seen.\par \par Immunohistochemistry:  CD20, CD3, CD34, , AE1-3, ,\par KappaISH, lambdaISH, kappa and lambda, MPO, CD71, E-cad, CD15\par stains  performed on formalin fixed paraffin embedded tissue,\par block 1B.\par \par CD34: Highlights vascular structures, no increase in blast\par population\par : Highlights erythroid series and scattered masts cells. No\par increase in blast population\par CD20: Highlights small B cells scattered interstitially\par CD3: Highlights small T cells scattered interstitially\par AE1-3: Negative\par \par  stain highlights plasma cells forming small clusters,\par comprising less than 5% marrow cellularity. KappaISH and\par lambdaISH stains are non-contributory.\par \par Kappa and lambda light chain immunostains are in process and will\par be reported as an addendum.\par \par MPO, CD71, E-cad and CD15 immunostains are in process and will be\par reported as an addendum.\par \par \par \par \par \par JANES MOTA                          2\par \par \par \par Cytopathology Report\par \par \par \par \par \par Flow cytometry analysis:  Not submitted\par \par Screened by: Christina MERCER (ASCP)\par Verified by: Vicki Prajapati MD\par (Electronic Signature)\par Reported on: 07/10/19 12:55 EDT, 6 [facility], [place], [place]\par [id number]\par Cytology technical processing performed at [address] [place] [id number]\par _________________________________________________________________\par \par \par Specimen(s) Submitted\par BONE, VERTEBRAL BODY, S3, CT GUIDED CORE BIOPSY AND FNA\par \par \par Clinical Information\par Spinal cord lesion. Rule out cancer. No history of cancer. Rule\par out cordoma.\par \par CT Chest, abdomen and pelvis (06/08/2019): Lytic lesions of S3\par vertebral body, lucency in posterior T 1,  anterior T7, and\par anterior T2 vertebral bodies are indeterminate, possible\par representing small hemangiomas versus additional lytic lesions\par \par \par Gross Description\par Core Biopsy:\par Tissue collected: Specimen container has been inspected to\par confirm patient?s name and date of birth, contains multiple tan\par cores measuring 0.3 - 1.0 cm in length (and multiple fragments).\par Entire specimen submitted in 2 cassette(s) labeled 1B and 1C.\par \par 1 Touch prep slides ( 1 air dried + 0 fixed)\par \par FNA:\par Received: 4 air dried and 4 fixed slides\par Needle rinses in 20 ml formalin\par Submitted: cell block in one cassette labeled 1A\par \par Prepared:\par 1 Touch Prep, 4 Diff Quick,  4 Pap Stained slides\par 1 cell block  labeled 1A\par 1 core biopsy labeled 1B\par 1 core biopsy labeled 1C\par 12 Total slides\par \par  \par \par  Ordered by: DOCUMENT, FRANKLIN       Collected/Examined: [id number] 03:11PM       \par Verification Not Required       Stage: Final       \par  Performed at: [facility]       Resulted: 10Jul2019 12:55PM       Last Updated: 10Jul2019 12:56PM       Accession: [id number]       \par  Pathology             Final\par \par No Documents Attached\par \par \par \par \par   [facility] Accession Number : [id number]\par \par JANES MOTA 2\par \par \par \par Cytopathology Report\par \par \par \par \par \par Final Diagnosis\par BONE, VERTEBRAL BODY, S3, CT GUIDED CORE BIOPSY AND FNA\par Cellular marrow with maturing trilineage hematopoiesis and mild\par plasmacytosis\par See note and description.\par \par Diagnostic note:  Per chart review, patient has lytic lesions in\par vertebral body S3. The current biopsy consists of fragmented\par marrow showing cellular marrow with maturing trilineage\par hematopoiesis and mild plasmacytosis. The clonality of the plasma\par cell population cannot be determined by kappaISH and lambdaISH\par studies. Immunohistochemistry work up for light chain restriction\par are in proces and will be reported as an addendum. Correlation\par with clinical findings (e.g.: serum immunoglobulin levels, SPEP,\par IF studies) and if clinically indicated a bone marrow biopsy with\par flow cytometry and cytogenetic analysis is recommended.\par \par Microscopic description:   Cytology slides, touch preps, cell\par block and histologic sections of core biopsy consists of small\par fragments of marrow elements with 40-50% cellularity showing\par maturing trilineage hematopoiesis with mild plasmacytosis. Plasma\par cells are mostly distributed perivascularly and some\par interstitially, occasional small groups are seen.\par \par Immunohistochemistry:  CD20, CD3, CD34, , AE1-3, ,\par KappaISH, lambdaISH, kappa and lambda, MPO, CD71, E-cad, CD15\par stains  performed on formalin fixed paraffin embedded tissue,\par block 1B.\par \par CD34: Highlights vascular structures, no increase in blast\par population\par : Highlights erythroid series and scattered masts cells. No\par increase in blast population\par CD20: Highlights small B cells scattered interstitially\par CD3: Highlights small T cells scattered interstitially\par AE1-3: Negative\par \par  stain highlights plasma cells forming small clusters,\par comprising less than 5% marrow cellularity. KappaISH and\par lambdaISH stains are non-contributory.\par \par Kappa and lambda light chain immunostains are in process and will\par be reported as an addendum.\par \par MPO, CD71, E-cad and CD15 immunostains are in process and will be\par reported as an addendum.\par \par \par \par \par \par JANES MOTA                          2\par \par \par \par Cytopathology Report\par \par \par \par \par \par Flow cytometry analysis:  Not submitted\par \par Screened by: Christina MERCER (ASCP)\par Verified by: Vicki Prajapati MD\par (Electronic Signature)\par Reported on: 07/10/19 12:55 EDT, 6 [facility], [place], [place]\par [address]\par Cytology technical processing performed at [address] [place] [id number]\par _________________________________________________________________\par \par \par Specimen(s) Submitted\par BONE, VERTEBRAL BODY, S3, CT GUIDED CORE BIOPSY AND FNA\par \par \par Clinical Information\par Spinal cord lesion. Rule out cancer. No history of cancer. Rule\par out cordoma.\par \par CT Chest, abdomen and pelvis (06/08/2019): Lytic lesions of S3\par vertebral body, lucency in posterior T 1,  anterior T7, and\par anterior T2 vertebral bodies are indeterminate, possible\par representing small hemangiomas versus additional lytic lesions\par \par \par Gross Description\par Core Biopsy:\par Tissue collected: Specimen container has been inspected to\par confirm patient?s name and date of birth, contains multiple tan\par cores measuring 0.3 - 1.0 cm in length (and multiple fragments).\par Entire specimen submitted in 2 cassette(s) labeled 1B and 1C.\par \par 1 Touch prep slides ( 1 air dried + 0 fixed)\par \par FNA:\par Received: 4 air dried and 4 fixed slides\par Needle rinses in 20 ml formalin\par Submitted: cell block in one cassette labeled 1A\par \par Prepared:\par 1 Touch Prep, 4 Diff Quick,  4 Pap Stained slides\par 1 cell block  labeled 1A\par 1 core biopsy labeled 1B\par 1 core biopsy labeled 1C\par 12 Total slides\par \par  \par \par  Ordered by: DOCUMENT, SCM       Collected/Examined: [id number] 03:11PM       \par Verification Not Required       Stage: Final       \par  Performed at: [facility]       Resulted: [id number] 12:55PM       Last Updated: [id number] 12:56PM       Accession: [id number]        [FreeTextEntry1] : surveillance with labs and imaging [de-identified] : Aug 1, 2019\christin Pt and family are here for f/u of results and further recommendations. She has no new complaints today.\par \par -----------------------------------------------\par Nov 18, 2019\christin Edmonds is here with family today for routine f/u. Labs were done 2 weeks ago. She is c/o new L sided pelvic/hip pain with limitation in mobility. Her weight and appetite are stable. Otherwise no acute complaints.\par ----------------------------------------------\christin Pt is here with  for scheduled appt. She reports stable health, no wt loss, no appetite changes, very active, no fatigue. Ocassional pain in L groin, no active back pain or pelvic pain at this time.

## 2020-03-06 NOTE — CONSULT LETTER
[Dear  ___] : Dear  [unfilled], [Courtesy Letter:] : I had the pleasure of seeing your patient, [unfilled], in my office today. [Please see my note below.] : Please see my note below. [Consult Closing:] : Thank you very much for allowing me to participate in the care of this patient.  If you have any questions, please do not hesitate to contact me. [Sincerely,] : Sincerely, [FreeTextEntry3] : Rosa Suazo MD. MS. \par Hematologist/Oncologist\par [facility]\par ph. [phone]\par fx. [phone]\par  [DrJonathan  ___] : Dr. COLINDRES [DrJonathan ___] : Dr. COLINDRES

## 2020-03-06 NOTE — REVIEW OF SYSTEMS
[Joint Pain] : joint pain [Joint Stiffness] : joint stiffness [Negative] : Allergic/Immunologic [FreeTextEntry9] : chronic

## 2020-03-06 NOTE — RESULTS/DATA
[FreeTextEntry1] : MRI spine done 11/29/2019\par IMPRESSION: \par Similar appearance of S3 osseous lesion extending into the epidural space, bilateral sacral forame\par and posterior pelvis as above. No new lesions identified. Differential again includes plasmacytom\par metastatic disease and less likely chordoma.\par \par  Pathology             Final\par \par No Documents Attached\par \par \par \par \par   [facility] [id number]\par \par JANES MOTA                          3\par \par \par \par Hematopathology Report\par \par \par \par \par Final Diagnosis\par 1, 2. Bone marrow biopsy and bone marrow aspirate\par - Trilineage hematopoiesis with maturation and iron stores\par present\par - Monotypic plasma cells present\par - Negative for Congo red stains\par \par See note and description.\par \par Diagnostic note:\par Monotypic plasma cells are present (plasma cell count: < 10% by\par  stain, 0.9% by flow cytometry,  9% by smear count).\par Correlation with relevant clinical, laboratory and radiologic\par imaging is suggested. Comprehensive report with results of\par pending ancillary studies to follow.\par \par Dr. ISAIAH Suazo was notified of the diagnosis on 07/31/19\par 13:43.\par \par Ancillary studies\par Bone marrow aspirate iron stain: Iron stores are present; no ring\par sideroblasts are seen.\par Congo red stains (block 1A and 1B) are negative for amyloidosis.\par \par Flow cytometry:  Monotypic plasma cells (0.9% of cells), positive\par for cytoplasmic kappa, CD38, , dimmer CD45, ; negative\par CD19, CD20, CD56.\par Lymphocytes (19% of cells): Heterogeneous population of T-cells\par (with normal CD4 to CD8 ratio, including few natural killer-like\par T-cells), natural killer cells, and polytypic B-cells.\par Hematogones are present.\par \par Immunohistochemical stains:   stains (blocks 1A and 1B) show\par approximately 5 to 9% plasma cells with cyclin-D1 positivity. In-\par situ hybridization studies (block 1A) for cytoplasmic kappa and\par lambda show kappa staining restriction.\par \par Microscopic description:\par 1. Biopsy: Sections of bone marrow biopsy show normocellularity\par (50 to 55% cellularity), trilineage hematopoiesis with\par maturation, megakaryocytes normal in number and morphology, mild\par plasmacytosis and iron stores present.\par \par 2. Aspirate: Cellular spicules are present, adequate for\par interpretation.  Maturing and mature myeloid and erythroid\par elements are present with normal M:E ratio.  Megakaryocytes\par appear normal in number and morphology.\par \par \par \par \par \par \par NAKULJANES ISAACS                          3\par \par \par \par Hematopathology Report\par \par \par \par \par Bone Marrow Aspirate Differential: (200 Cells).\par Type            %    Normal*\par Blast                1%   0-3\par Neutrophil and\par Precursors        52%  47-74\par Eosinophil           4%   1-3\par Basophil        1%   0-1\par Pronormoblast        2%   0-2\par Normoblast           19%  15-25\par Monocyte        2%   0-1\par Lymphocyte           10%  5-15\par Plasma cell          9%   0-2\par *Adult Range\par \par Comment:  Iron stain (examined to evaluate for iron stores; see\par microscopic description) and Giemsa stain (shows appropriate\par staining pattern) are performed and evaluated on block(s): 1A,\par 1B.\par \par Built in immunohistochemical study control(s) associated with\par this case have been verified by the sign out pathologist.\par These immunohistochemical/ in-situ hybridization tests have been\par developed and their performance characteristics determined by\par [facility], Department of\par Pathology, Division of Immunopathology [facility]\par [place], [address] [place] [address].  It has not\par been cleared or approved by the U.S. Food and Drug\par Administration.  The FDA has determined that such clearance or\par approval is not necessary.  This test is used for clinical\par purposes.  The laboratory is certified under the\par CLIA-88 as qualified to perform high complexity clinical testing.\par \par Verified by: Jaime Ramey M.D.\par (Electronic Signature)\par Reported on: 07/31/19 13:49 EDT, 6 [facility], [place], [place]\par [address]\par _________________________________________________________________\par \par Clinical History\par Rule out MM, amyloid congo red please\par CBC on 7/22/2019: WBC: 7.5 K/uL, HGB: 13.6 g/dL, MCV: 91.0 fl,\par Plt: 254 K/uL\par SPEP showed M-spike 0.5 g/dL, immunofixation of serum showed: IgA\par kappa\par \par Specimen(s) Submitted\par 1     Bone marrow biopsy\par 2     Bone marrow aspirate\par \par \par \par \par \par JANES MOTA\par \par \par \par Hematopathology Report\par \par \par \par \par \par Gross Description\par 1. The specimen is received in bouin's fixative and the specimen\par container is labeled: Right PIC bone marrow biopsy.  It consists\par of a 1.1 cm in length x 0.2 cm in diameter bone marrow core with\par a 3.0 x 2.8 x 0.4 cm aggregate of blood clot. Special stains are\par requested. Entirely submitted.  Two cassettes: 1A = bone marrow\par core; 1B = blood clot.\par \par 2. Two Lind-Giemsa stained bone marrow aspirate smears are\par submitted [10-FL-, ].\par \par In addition to other data that may appear on the specimen\par container, the label has been inspected to confirm the presence\par of the patient's name and date of birth.\par \par Sandrine Valencia 07/24/2019 16:09\par \par  \par \par  Ordered by: AME SUAZO       Collected/Examined: [id number] 10:45AM       \par Verified by: AME SUAZO [id number] 04:59PM       \par  Result Communication: No patient communication needed at this time;\par Stage: Final       \par  Performed at: [facility]       Resulted: [id number] 01:49PM       Last Updated: [id number] 04:59PM       Accession: [id number]      \par \par \par  Pathology             Final\par \par No Documents Attached\par \par \par \par \par   [facility] Accession Number : [id number]\par \par JANES MOTA 2\par \par \par \par Cytopathology Report\par \par \par \par \par \par Final Diagnosis\par BONE, VERTEBRAL BODY, S3, CT GUIDED CORE BIOPSY AND FNA\par Cellular marrow with maturing trilineage hematopoiesis and mild\par plasmacytosis\par See note and description.\par \par Diagnostic note:  Per chart review, patient has lytic lesions in\par vertebral body S3. The current biopsy consists of fragmented\par marrow showing cellular marrow with maturing trilineage\par hematopoiesis and mild plasmacytosis. The clonality of the plasma\par cell population cannot be determined by kappaISH and lambdaISH\par studies. Immunohistochemistry work up for light chain restriction\par are in proces and will be reported as an addendum. Correlation\par with clinical findings (e.g.: serum immunoglobulin levels, SPEP,\par IF studies) and if clinically indicated a bone marrow biopsy with\par flow cytometry and cytogenetic analysis is recommended.\par \par Microscopic description:   Cytology slides, touch preps, cell\par block and histologic sections of core biopsy consists of small\par fragments of marrow elements with 40-50% cellularity showing\par maturing trilineage hematopoiesis with mild plasmacytosis. Plasma\par cells are mostly distributed perivascularly and some\par interstitially, occasional small groups are seen.\par \par Immunohistochemistry:  CD20, CD3, CD34, , AE1-3, ,\par KappaISH, lambdaISH, kappa and lambda, MPO, CD71, E-cad, CD15\par stains  performed on formalin fixed paraffin embedded tissue,\par block 1B.\par \par CD34: Highlights vascular structures, no increase in blast\par population\par : Highlights erythroid series and scattered masts cells. No\par increase in blast population\par CD20: Highlights small B cells scattered interstitially\par CD3: Highlights small T cells scattered interstitially\par AE1-3: Negative\par \par  stain highlights plasma cells forming small clusters,\par comprising less than 5% marrow cellularity. KappaISH and\par lambdaISH stains are non-contributory.\par \par Kappa and lambda light chain immunostains are in process and will\par be reported as an addendum.\par \par MPO, CD71, E-cad and CD15 immunostains are in process and will be\par reported as an addendum.\par \par \par \par \par \par JANES MOTA                          2\par \par \par \par Cytopathology Report\par \par \par \par \par \par Flow cytometry analysis:  Not submitted\par \par Screened by: Christina MERCER (ASCP)\par Verified by: Vicki Prajapati MD\par (Electronic Signature)\par Reported on: 07/10/19 12:55 EDT, 6 [facility], [place], [place]\par [address]\par Cytology technical processing performed at [address] [place] [id number]\par _________________________________________________________________\par \par \par Specimen(s) Submitted\par BONE, VERTEBRAL BODY, S3, CT GUIDED CORE BIOPSY AND FNA\par \par \par Clinical Information\par Spinal cord lesion. Rule out cancer. No history of cancer. Rule\par out cordoma.\par \par CT Chest, abdomen and pelvis (06/08/2019): Lytic lesions of S3\par vertebral body, lucency in posterior T 1,  anterior T7, and\par anterior T2 vertebral bodies are indeterminate, possible\par representing small hemangiomas versus additional lytic lesions\par \par \par Gross Description\par Core Biopsy:\par Tissue collected: Specimen container has been inspected to\par confirm patient?s name and date of birth, contains multiple tan\par cores measuring 0.3 - 1.0 cm in length (and multiple fragments).\par Entire specimen submitted in 2 cassette(s) labeled 1B and 1C.\par \par 1 Touch prep slides ( 1 air dried + 0 fixed)\par \par FNA:\par Received: 4 air dried and 4 fixed slides\par Needle rinses in 20 ml formalin\par Submitted: cell block in one cassette labeled 1A\par \par Prepared:\par 1 Touch Prep, 4 Diff Quick,  4 Pap Stained slides\par 1 cell block  labeled 1A\par 1 core biopsy labeled 1B\par 1 core biopsy labeled 1C\par 12 Total slides\par \par  \par \par  Ordered by: DOCUMENT, SCM       Collected/Examined: 02Jul2019 03:11PM       \par Verification Not Required       Stage: Final       \par  Performed at: [facility]       Resulted: 10Jul2019 12:55PM       Last Updated: 10Jul2019 12:56PM       Accession: [id number]        \par \par \par  Pathology             Final\par \par No Documents Attached\par \par \par \par \par   [facility] Accession Number : [id number]\par \par NAKULJANES ISAACS                          3\par \par \par \par Cytopathology Addendum Report\par \par \par \par \par Addendum\par For reporting additional immunostain results\par \par Immunohistochemistry:\par Kappa and lambda light chain stains looks polyclonal in this\par biopsy with high background staining.\par CD71: Highlights erythroid series\par E-cad: Negative\par MPO: Highlights myeloid series\par CD15: Highlights maturing myeloid series\par \par The diagnosis remains unchanged.\par \par Verified by: Vicki Prajapati MD\par (Electronic Signature)\par Reported on: 07/15/19 15:10 EDT, 6 [place], [place]\par [address]\par _________________________________________________________________\par \par \par Cytopathology Report\par \par \par \par \par \par Final Diagnosis\par BONE, VERTEBRAL BODY, S3, CT GUIDED CORE BIOPSY AND FNA\par Cellular marrow with maturing trilineage hematopoiesis and mild\par plasmacytosis\par See note and description.\par \par Diagnostic note:  Per chart review, patient has lytic lesions in\par vertebral body S3. The current biopsy consists of fragmented\par marrow showing cellular marrow with maturing trilineage\par hematopoiesis and mild plasmacytosis. The clonality of the plasma\par cell population cannot be determined by kappaISH and lambdaISH\par studies. Immunohistochemistry work up for light chain restriction\par are in proces and will be reported as an addendum. Correlation\par with clinical findings (e.g.: serum immunoglobulin levels, SPEP,\par IF studies) and if clinically indicated a bone marrow biopsy with\par flow cytometry and cytogenetic analysis is recommended.\par \par Microscopic description:   Cytology slides, touch preps, cell\par block and histologic sections of core biopsy consists of small\par fragments of marrow elements with 40-50% cellularity showing\par maturing trilineage hematopoiesis with mild\par \par \par \par \par \par JANES MOTA                          3\par \par \par \par Cytopathology Report\par \par \par \par \par plasmacytosis. Plasma cells are mostly distributed perivascularly\par and some interstitially, occasional small groups are seen.\par \par Immunohistochemistry:  CD20, CD3, CD34, , AE1-3, ,\par KappaISH, lambdaISH, kappa and lambda, MPO, CD71, E-cad, CD15\par stains  performed on formalin fixed paraffin embedded tissue,\par block 1B.\par \par CD34: Highlights vascular structures, no increase in blast\par population\par : Highlights erythroid series and scattered masts cells. No\par increase in blast population\par CD20: Highlights small B cells scattered interstitially\par CD3: Highlights small T cells scattered interstitially\par AE1-3: Negative\par \par  stain highlights plasma cells forming small clusters,\par comprising less than 5% marrow cellularity. KappaISH and\par lambdaISH stains are non-contributory.\par \par Kappa and lambda light chain immunostains are in process and will\par be reported as an addendum.\par \par MPO, CD71, E-cad and CD15 immunostains are in process and will be\par reported as an addendum.\par \par Flow cytometry analysis:  Not submitted\par \par Screened by: Christina MERCER (ASCP)\par Verified by: Vicki Prajapati MD\par (Electronic Signature)\par Reported on: 07/10/19 12:55 EDT, [address], [place], [place]\par [address]\par Cytology technical processing performed at [address] [place] [address]\par _________________________________________________________________\par \par \par Specimen(s) Submitted\par BONE, VERTEBRAL BODY, S3, CT GUIDED CORE BIOPSY AND FNA\par \par \par Clinical Information\par Spinal cord lesion. Rule out cancer. No history of cancer. Rule\par out cordoma.\par \par \par \par \par \par \par \par NAKULJANES ISAACS                          3\par \par \par \par Cytopathology Report\par \par \par \par \par CT Chest, abdomen and pelvis (06/08/2019): Lytic lesions of S3\par vertebral body, lucency in posterior T 1,  anterior T7, and\par anterior T2 vertebral bodies are indeterminate, possible\par representing small hemangiomas versus additional lytic lesions\par \par \par Gross Description\par Core Biopsy:\par Tissue collected: Specimen container has been inspected to\par confirm patient?s name and date of birth, contains multiple tan\par cores measuring 0.3 - 1.0 cm in length (and multiple fragments).\par Entire specimen submitted in 2 cassette(s) labeled 1B and 1C.\par \par 1 Touch prep slides ( 1 air dried + 0 fixed)\par \par FNA:\par Received: 4 air dried and 4 fixed slides\par Needle rinses in 20 ml formalin\par Submitted: cell block in one cassette labeled 1A\par \par Prepared:\par 1 Touch Prep, 4 Diff Quick,  4 Pap Stained slides\par 1 cell block  labeled 1A\par 1 core biopsy labeled 1B\par 1 core biopsy labeled 1C\par 12 Total slides\par \par  \par \par  Ordered by: DOCUMENT, SCM       Collected/Examined: [id number] 03:11PM       \par Verification Not Required       Stage: Final       \par  Performed at: [facility]       Resulted: [id number] 03:10PM       Last Updated: [id number] 03:10PM       Accession: [id number]

## 2020-03-06 NOTE — ASSESSMENT
[FreeTextEntry1] : IgA Kappa MGUS\par BMBx 7/2019 c/w 9% plasmacytosis\par No e/o end organ damage\par PET/CT 7/2019 no increased avidity, soft tissue avidity R trochanter c/w enthesophyte. \par \par MRI spine done 11/29/2019\par IMPRESSION: \par Similar appearance of S3 osseous lesion extending into the epidural space, bilateral sacral forame\par and posterior pelvis as above. No new lesions identified. Differential again includes plasmacytom\par metastatic disease and less likely chordoma.

## 2020-06-11 ENCOUNTER — OUTPATIENT (OUTPATIENT)
Dept: OUTPATIENT SERVICES | Facility: HOSPITAL | Age: 65
LOS: 1 days | Discharge: HOME | End: 2020-06-11

## 2020-06-11 DIAGNOSIS — C90.00 MULTIPLE MYELOMA NOT HAVING ACHIEVED REMISSION: ICD-10-CM

## 2020-06-15 ENCOUNTER — RESULT REVIEW (OUTPATIENT)
Age: 65
End: 2020-06-15

## 2020-06-15 ENCOUNTER — APPOINTMENT (OUTPATIENT)
Dept: HEMATOLOGY ONCOLOGY | Facility: CLINIC | Age: 65
End: 2020-06-15

## 2020-06-15 LAB
ALBUMIN SERPL ELPH-MCNC: 4.7 G/DL
ALP BLD-CCNC: 88 U/L
ALT SERPL-CCNC: 35 U/L
ANION GAP SERPL CALC-SCNC: 11 MMOL/L
AST SERPL-CCNC: 31 U/L
BASOPHILS # BLD AUTO: 0.06 K/UL — SIGNIFICANT CHANGE UP (ref 0–0.2)
BASOPHILS NFR BLD AUTO: 0.8 % — SIGNIFICANT CHANGE UP (ref 0–2)
BILIRUB SERPL-MCNC: 0.3 MG/DL
BUN SERPL-MCNC: 14 MG/DL
CALCIUM SERPL-MCNC: 9.6 MG/DL
CHLORIDE SERPL-SCNC: 107 MMOL/L
CO2 SERPL-SCNC: 25 MMOL/L
CREAT SERPL-MCNC: 0.77 MG/DL
EOSINOPHIL # BLD AUTO: 0.18 K/UL — SIGNIFICANT CHANGE UP (ref 0–0.5)
EOSINOPHIL NFR BLD AUTO: 2.4 % — SIGNIFICANT CHANGE UP (ref 0–6)
GLUCOSE SERPL-MCNC: 97 MG/DL
HCT VFR BLD CALC: 41.7 % — SIGNIFICANT CHANGE UP (ref 34.5–45)
HGB BLD-MCNC: 13.5 G/DL — SIGNIFICANT CHANGE UP (ref 11.5–15.5)
IMM GRANULOCYTES NFR BLD AUTO: 0.5 % — SIGNIFICANT CHANGE UP (ref 0–1.5)
LDH SERPL-CCNC: 180 U/L
LYMPHOCYTES # BLD AUTO: 3.17 K/UL — SIGNIFICANT CHANGE UP (ref 1–3.3)
LYMPHOCYTES # BLD AUTO: 41.7 % — SIGNIFICANT CHANGE UP (ref 13–44)
MCHC RBC-ENTMCNC: 29.6 PG — SIGNIFICANT CHANGE UP (ref 27–34)
MCHC RBC-ENTMCNC: 32.4 GM/DL — SIGNIFICANT CHANGE UP (ref 32–36)
MCV RBC AUTO: 91.4 FL — SIGNIFICANT CHANGE UP (ref 80–100)
MONOCYTES # BLD AUTO: 0.42 K/UL — SIGNIFICANT CHANGE UP (ref 0–0.9)
MONOCYTES NFR BLD AUTO: 5.5 % — SIGNIFICANT CHANGE UP (ref 2–14)
NEUTROPHILS # BLD AUTO: 3.74 K/UL — SIGNIFICANT CHANGE UP (ref 1.8–7.4)
NEUTROPHILS NFR BLD AUTO: 49.1 % — SIGNIFICANT CHANGE UP (ref 43–77)
NRBC # BLD: 0 /100 WBCS — SIGNIFICANT CHANGE UP (ref 0–0)
PLATELET # BLD AUTO: 291 K/UL — SIGNIFICANT CHANGE UP (ref 150–400)
POTASSIUM SERPL-SCNC: 4.7 MMOL/L
PROT SERPL-MCNC: 6.9 G/DL
RBC # BLD: 4.56 M/UL — SIGNIFICANT CHANGE UP (ref 3.8–5.2)
RBC # FLD: 14 % — SIGNIFICANT CHANGE UP (ref 10.3–14.5)
SODIUM SERPL-SCNC: 143 MMOL/L
WBC # BLD: 7.61 K/UL — SIGNIFICANT CHANGE UP (ref 3.8–10.5)
WBC # FLD AUTO: 7.61 K/UL — SIGNIFICANT CHANGE UP (ref 3.8–10.5)

## 2020-06-16 LAB
ALBUMIN MFR SERPL ELPH: 57.3 %
ALBUMIN SERPL-MCNC: 4 G/DL
ALBUMIN/GLOB SERPL: 1.4 RATIO
ALPHA1 GLOB MFR SERPL ELPH: 4.7 %
ALPHA1 GLOB SERPL ELPH-MCNC: 0.3 G/DL
ALPHA2 GLOB MFR SERPL ELPH: 12.1 %
ALPHA2 GLOB SERPL ELPH-MCNC: 0.8 G/DL
B-GLOBULIN MFR SERPL ELPH: 18.5 %
B-GLOBULIN SERPL ELPH-MCNC: 1.3 G/DL
DEPRECATED KAPPA LC FREE/LAMBDA SER: 2.34 RATIO
GAMMA GLOB FLD ELPH-MCNC: 0.5 G/DL
GAMMA GLOB MFR SERPL ELPH: 7.4 %
IGA SER QL IEP: 584 MG/DL
IGG SER QL IEP: 568 MG/DL
IGM SER QL IEP: 39 MG/DL
INTERPRETATION SERPL IEP-IMP: NORMAL
KAPPA LC CSF-MCNC: 0.91 MG/DL
KAPPA LC SERPL-MCNC: 2.13 MG/DL
M PROTEIN MFR SERPL ELPH: 6.2 %
M PROTEIN SPEC IFE-MCNC: NORMAL
MONOCLON BAND OBS SERPL: 0.4 G/DL
PROT SERPL-MCNC: 6.9 G/DL
PROT SERPL-MCNC: 6.9 G/DL

## 2020-06-22 ENCOUNTER — APPOINTMENT (OUTPATIENT)
Dept: HEMATOLOGY ONCOLOGY | Facility: CLINIC | Age: 65
End: 2020-06-22
Payer: MEDICARE

## 2020-06-22 VITALS
SYSTOLIC BLOOD PRESSURE: 149 MMHG | WEIGHT: 188.94 LBS | DIASTOLIC BLOOD PRESSURE: 78 MMHG | TEMPERATURE: 98.6 F | HEART RATE: 67 BPM | BODY MASS INDEX: 31.44 KG/M2 | OXYGEN SATURATION: 98 % | RESPIRATION RATE: 17 BRPM

## 2020-06-22 PROCEDURE — 99214 OFFICE O/P EST MOD 30 MIN: CPT

## 2020-06-22 NOTE — ASSESSMENT
[FreeTextEntry1] : IgA Kappa MGUS\par BMBx 7/2019 c/w 9% plasmacytosis\par No e/o end organ damage\par PET/CT 7/2019 no increased avidity, soft tissue avidity R trochanter c/w enthesophyte. \par \par MRI spine done 11/29/2019\par IMPRESSION: \par Similar appearance of S3 osseous lesion extending into the epidural space, bilateral sacral forame\par and posterior pelvis as above. No new lesions identified. Differential again includes plasmacytom\par metastatic disease and less likely chordoma. \par \par

## 2020-06-22 NOTE — REVIEW OF SYSTEMS
[Joint Pain] : joint pain [Joint Stiffness] : joint stiffness [FreeTextEntry9] : chronic diffuse OA pain [Negative] : Allergic/Immunologic

## 2020-06-22 NOTE — HISTORY OF PRESENT ILLNESS
[Disease:__________________________] : Disease: [unfilled] [de-identified] : int risk dz [de-identified] : July 22, 2019\par "65 yo female with a recent hx of severe R pelvic pain seen by ortho in June and had MRI done of L-S spine done June 3rd showed S3 soft tissue lesion with epidural extension concerning for possible neoplasm or mets, CT imaging on June 8th showed S3 lytic lesion in vertebral body, lucencies in T2, T7, T11 possibly representing hemangiomas vs lytic lesions. Pt had biopsy done by IR on July 3rd, 2019, which was nondiagnostic. Showed polytypic plasma cells, comprising less than 5% of the cells. The bone marrow showed normal trilineage hematopoesis. Additional kappa and lambda restriction is in process. The pt was referred for hematology evaluation for concern of multiple myeloma. She is accompanied with  and dtr to the office visit. She was also advised to see Dr Hart for BMT and RadOnc (Dr Hdez).\par Pt reports marked improvement in R pelvic pain, now able to walk and move w/o significant discomfort. No intervention has been done for S3 lesion as yet. She has no hx of anemia or renal insufficiency or other areas of bone pain. She has no hx of weight loss or fatigue. " [de-identified] : \par  Pathology             Final\par \par No Documents Attached\par \par \par \par \par   [facility] Accession Number : [id number]\par \par JANES MOTA 2\par \par \par \par Cytopathology Report\par \par \par \par \par \par Final Diagnosis\par BONE, VERTEBRAL BODY, S3, CT GUIDED CORE BIOPSY AND FNA\par Cellular marrow with maturing trilineage hematopoiesis and mild\par plasmacytosis\par See note and description.\par \par Diagnostic note:  Per chart review, patient has lytic lesions in\par vertebral body S3. The current biopsy consists of fragmented\par marrow showing cellular marrow with maturing trilineage\par hematopoiesis and mild plasmacytosis. The clonality of the plasma\par cell population cannot be determined by kappaISH and lambdaISH\par studies. Immunohistochemistry work up for light chain restriction\par are in proces and will be reported as an addendum. Correlation\par with clinical findings (e.g.: serum immunoglobulin levels, SPEP,\par IF studies) and if clinically indicated a bone marrow biopsy with\par flow cytometry and cytogenetic analysis is recommended.\par \par Microscopic description:   Cytology slides, touch preps, cell\par block and histologic sections of core biopsy consists of small\par fragments of marrow elements with 40-50% cellularity showing\par maturing trilineage hematopoiesis with mild plasmacytosis. Plasma\par cells are mostly distributed perivascularly and some\par interstitially, occasional small groups are seen.\par \par Immunohistochemistry:  CD20, CD3, CD34, , AE1-3, ,\par KappaISH, lambdaISH, kappa and lambda, MPO, CD71, E-cad, CD15\par stains  performed on formalin fixed paraffin embedded tissue,\par block 1B.\par \par CD34: Highlights vascular structures, no increase in blast\par population\par : Highlights erythroid series and scattered masts cells. No\par increase in blast population\par CD20: Highlights small B cells scattered interstitially\par CD3: Highlights small T cells scattered interstitially\par AE1-3: Negative\par \par  stain highlights plasma cells forming small clusters,\par comprising less than 5% marrow cellularity. KappaISH and\par lambdaISH stains are non-contributory.\par \par Kappa and lambda light chain immunostains are in process and will\par be reported as an addendum.\par \par MPO, CD71, E-cad and CD15 immunostains are in process and will be\par reported as an addendum.\par \par \par \par \par \par JANES MOTA                          2\par \par \par \par Cytopathology Report\par \par \par \par \par \par Flow cytometry analysis:  Not submitted\par \par Screened by: Christina MERCER (ASCP)\par Verified by: Vicki Prajapati MD\par (Electronic Signature)\par Reported on: 07/10/19 12:55 EDT, 6 [facility], [place], [place]\par [address]\par Cytology technical processing performed at [address] [place] [address]\par _________________________________________________________________\par \par \par Specimen(s) Submitted\par BONE, VERTEBRAL BODY, S3, CT GUIDED CORE BIOPSY AND FNA\par \par \par Clinical Information\par Spinal cord lesion. Rule out cancer. No history of cancer. Rule\par out cordoma.\par \par CT Chest, abdomen and pelvis (06/08/2019): Lytic lesions of S3\par vertebral body, lucency in posterior T 1,  anterior T7, and\par anterior T2 vertebral bodies are indeterminate, possible\par representing small hemangiomas versus additional lytic lesions\par \par \par Gross Description\par Core Biopsy:\par Tissue collected: Specimen container has been inspected to\par confirm patient?s name and date of birth, contains multiple tan\par cores measuring 0.3 - 1.0 cm in length (and multiple fragments).\par Entire specimen submitted in 2 cassette(s) labeled 1B and 1C.\par \par 1 Touch prep slides ( 1 air dried + 0 fixed)\par \par FNA:\par Received: 4 air dried and 4 fixed slides\par Needle rinses in 20 ml formalin\par Submitted: cell block in one cassette labeled 1A\par \par Prepared:\par 1 Touch Prep, 4 Diff Quick,  4 Pap Stained slides\par 1 cell block  labeled 1A\par 1 core biopsy labeled 1B\par 1 core biopsy labeled 1C\par 12 Total slides\par \par  \par \par  Ordered by: DOCUMENT, FRANKLIN       Collected/Examined: [id number] 03:11PM       \par Verification Not Required       Stage: Final       \par  Performed at: [facility]       Resulted: 10Jul2019 12:55PM       Last Updated: 10Jul2019 12:56PM       Accession: [id number]       \par  Pathology             Final\par \par No Documents Attached\par \par \par \par \par   [facility] Accession Number : [id number]\par \par JANES MOTA 2\par \par \par \par Cytopathology Report\par \par \par \par \par \par Final Diagnosis\par BONE, VERTEBRAL BODY, S3, CT GUIDED CORE BIOPSY AND FNA\par Cellular marrow with maturing trilineage hematopoiesis and mild\par plasmacytosis\par See note and description.\par \par Diagnostic note:  Per chart review, patient has lytic lesions in\par vertebral body S3. The current biopsy consists of fragmented\par marrow showing cellular marrow with maturing trilineage\par hematopoiesis and mild plasmacytosis. The clonality of the plasma\par cell population cannot be determined by kappaISH and lambdaISH\par studies. Immunohistochemistry work up for light chain restriction\par are in proces and will be reported as an addendum. Correlation\par with clinical findings (e.g.: serum immunoglobulin levels, SPEP,\par IF studies) and if clinically indicated a bone marrow biopsy with\par flow cytometry and cytogenetic analysis is recommended.\par \par Microscopic description:   Cytology slides, touch preps, cell\par block and histologic sections of core biopsy consists of small\par fragments of marrow elements with 40-50% cellularity showing\par maturing trilineage hematopoiesis with mild plasmacytosis. Plasma\par cells are mostly distributed perivascularly and some\par interstitially, occasional small groups are seen.\par \par Immunohistochemistry:  CD20, CD3, CD34, , AE1-3, ,\par KappaISH, lambdaISH, kappa and lambda, MPO, CD71, E-cad, CD15\par stains  performed on formalin fixed paraffin embedded tissue,\par block 1B.\par \par CD34: Highlights vascular structures, no increase in blast\par population\par : Highlights erythroid series and scattered masts cells. No\par increase in blast population\par CD20: Highlights small B cells scattered interstitially\par CD3: Highlights small T cells scattered interstitially\par AE1-3: Negative\par \par  stain highlights plasma cells forming small clusters,\par comprising less than 5% marrow cellularity. KappaISH and\par lambdaISH stains are non-contributory.\par \par Kappa and lambda light chain immunostains are in process and will\par be reported as an addendum.\par \par MPO, CD71, E-cad and CD15 immunostains are in process and will be\par reported as an addendum.\par \par \par \par \par \par JANES MOTA                          2\par \par \par \par Cytopathology Report\par \par \par \par \par \par Flow cytometry analysis:  Not submitted\par \par Screened by: Christina MERCER (ASCP)\par Verified by: Vicki Prajapati MD\par (Electronic Signature)\par Reported on: 07/10/19 12:55 EDT, 6 [facility], [place], [place]\par [address]\par Cytology technical processing performed at [address] [place] [address]\par _________________________________________________________________\par \par \par Specimen(s) Submitted\par BONE, VERTEBRAL BODY, S3, CT GUIDED CORE BIOPSY AND FNA\par \par \par Clinical Information\par Spinal cord lesion. Rule out cancer. No history of cancer. Rule\par out cordoma.\par \par CT Chest, abdomen and pelvis (06/08/2019): Lytic lesions of S3\par vertebral body, lucency in posterior T 1,  anterior T7, and\par anterior T2 vertebral bodies are indeterminate, possible\par representing small hemangiomas versus additional lytic lesions\par \par \par Gross Description\par Core Biopsy:\par Tissue collected: Specimen container has been inspected to\par confirm patient?s name and date of birth, contains multiple tan\par cores measuring 0.3 - 1.0 cm in length (and multiple fragments).\par Entire specimen submitted in 2 cassette(s) labeled 1B and 1C.\par \par 1 Touch prep slides ( 1 air dried + 0 fixed)\par \par FNA:\par Received: 4 air dried and 4 fixed slides\par Needle rinses in 20 ml formalin\par Submitted: cell block in one cassette labeled 1A\par \par Prepared:\par 1 Touch Prep, 4 Diff Quick,  4 Pap Stained slides\par 1 cell block  labeled 1A\par 1 core biopsy labeled 1B\par 1 core biopsy labeled 1C\par 12 Total slides\par \par  \par \par  Ordered by: DOCUMENT, SCM       Collected/Examined: [id number] 03:11PM       \par Verification Not Required       Stage: Final       \par  Performed at: [facility]       Resulted: [id number] 12:55PM       Last Updated: [id number] 12:56PM       Accession: [id number]        [Treatment Protocol] : Treatment Protocol [FreeTextEntry1] : surveillance with labs and imaging [de-identified] : Aug 1, 2019\christin Pt and family are here for f/u of results and further recommendations. She has no new complaints today.\par \par -----------------------------------------------\par Nov 18, 2019van Edmonds is here with family today for routine f/u. Labs were done 2 weeks ago. She is c/o new L sided pelvic/hip pain with limitation in mobility. Her weight and appetite are stable. Otherwise no acute complaints.\par ----------------------------------------------\par March 5, 2020van Edmonds is here with  for scheduled appt. She reports stable health, no wt loss, no appetite changes, very active, no fatigue. Ocassional pain in L groin, no active back pain or pelvic pain at this time. \par -----------------------------------------------\christin Pt is here for evaluation, she is in stable health, no acute complaints.

## 2020-06-22 NOTE — RESULTS/DATA
[FreeTextEntry1] : MRI spine done 11/29/2019\par IMPRESSION: \par Similar appearance of S3 osseous lesion extending into the epidural space, bilateral sacral forame\par and posterior pelvis as above. No new lesions identified. Differential again includes plasmacytom\par metastatic disease and less likely chordoma.\par \par  Pathology             Final\par \par No Documents Attached\par \par \par \par \par   [facility] Accession [facility] : [id number]\par \par JANES MOTA                          3\par \par \par \par Hematopathology Report\par \par \par \par \par Final Diagnosis\par 1, 2. Bone marrow biopsy and bone marrow aspirate\par - Trilineage hematopoiesis with maturation and iron stores\par present\par - Monotypic plasma cells present\par - Negative for Congo red stains\par \par See note and description.\par \par Diagnostic note:\par Monotypic plasma cells are present (plasma cell count: < 10% by\par  stain, 0.9% by flow cytometry,  9% by smear count).\par Correlation with relevant clinical, laboratory and radiologic\par imaging is suggested. Comprehensive report with results of\par pending ancillary studies to follow.\par \par Dr. ISAIAH Suazo was notified of the diagnosis on 07/31/19\par 13:43.\par \par Ancillary studies\par Bone marrow aspirate iron stain: Iron stores are present; no ring\par sideroblasts are seen.\par Congo red stains (block 1A and 1B) are negative for amyloidosis.\par \par Flow cytometry:  Monotypic plasma cells (0.9% of cells), positive\par for cytoplasmic kappa, CD38, , dimmer CD45, ; negative\par CD19, CD20, CD56.\par Lymphocytes (19% of cells): Heterogeneous population of T-cells\par (with normal CD4 to CD8 ratio, including few natural killer-like\par T-cells), natural killer cells, and polytypic B-cells.\par Hematogones are present.\par \par Immunohistochemical stains:   stains (blocks 1A and 1B) show\par approximately 5 to 9% plasma cells with cyclin-D1 positivity. In-\par situ hybridization studies (block 1A) for cytoplasmic kappa and\par lambda show kappa staining restriction.\par \par Microscopic description:\par 1. Biopsy: Sections of bone marrow biopsy show normocellularity\par (50 to 55% cellularity), trilineage hematopoiesis with\par maturation, megakaryocytes normal in number and morphology, mild\par plasmacytosis and iron stores present.\par \par 2. Aspirate: Cellular spicules are present, adequate for\par interpretation.  Maturing and mature myeloid and erythroid\par elements are present with normal M:E ratio.  Megakaryocytes\par appear normal in number and morphology.\par \par \par \par \par \par \par NAKULJANES ISAACS                          3\par \par \par \par Hematopathology Report\par \par \par \par \par Bone Marrow Aspirate Differential: (200 Cells).\par Type            %    Normal*\par Blast                1%   0-3\par Neutrophil and\par Precursors        52%  47-74\par Eosinophil           4%   1-3\par Basophil        1%   0-1\par Pronormoblast        2%   0-2\par Normoblast           19%  15-25\par Monocyte        2%   0-1\par Lymphocyte           10%  5-15\par Plasma cell          9%   0-2\par *Adult Range\par \par Comment:  Iron stain (examined to evaluate for iron stores; see\par microscopic description) and Giemsa stain (shows appropriate\par staining pattern) are performed and evaluated on block(s): 1A,\par 1B.\par \par Built in immunohistochemical study control(s) associated with\par this case have been verified by the sign out pathologist.\par These immunohistochemical/ in-situ hybridization tests have been\par developed and their performance characteristics determined by\par [facility], Department of\par Pathology, Division of Immunopathology [facility]\par [place], [phone] [place] [address].  It has not\par been cleared or approved by the U.S. Food and Drug\par Administration.  The FDA has determined that such clearance or\par approval is not necessary.  This test is used for clinical\par purposes.  The laboratory is certified under the\par CLIA-88 as qualified to perform high complexity clinical testing.\par \par Verified by: Jaime Ramey M.D.\par (Electronic Signature)\par Reported on: 07/31/19 13:49 EDT, 6 [facility], [place], [place]\par [address]\par _________________________________________________________________\par \par Clinical History\par Rule out MM, amyloid congo red please\par CBC on 7/22/2019: WBC: 7.5 K/uL, HGB: 13.6 g/dL, MCV: 91.0 fl,\par Plt: 254 K/uL\par SPEP showed M-spike 0.5 g/dL, immunofixation of serum showed: IgA\par kappa\par \par Specimen(s) Submitted\par 1     Bone marrow biopsy\par 2     Bone marrow aspirate\par \par \par \par \par \par JANES MOTA\par \par \par \par Hematopathology Report\par \par \par \par \par \par Gross Description\par 1. The specimen is received in bouin's fixative and the specimen\par container is labeled: Right PIC bone marrow biopsy.  It consists\par of a 1.1 cm in length x 0.2 cm in diameter bone marrow core with\par a 3.0 x 2.8 x 0.4 cm aggregate of blood clot. Special stains are\par requested. Entirely submitted.  Two cassettes: 1A = bone marrow\par core; 1B = blood clot.\par \par 2. Two Lind-Giemsa stained bone marrow aspirate smears are\par submitted [10-FL-, ].\par \par In addition to other data that may appear on the specimen\par container, the label has been inspected to confirm the presence\par of the patient's name and date of birth.\par \par Sandrine Valencia 07/24/2019 16:09\par \par  \par \par  Ordered by: AME SUAZO       Collected/Examined: [id number] 10:45AM       \par Verified by: AME SUAZO [id number] 04:59PM       \par  Result Communication: No patient communication needed at this time;\par Stage: Final       \par  Performed at: [facility]       Resulted: [id number] 01:49PM       Last Updated: [id number] 04:59PM       Accession: [id number]      \par \par \par  Pathology             Final\par \par No Documents Attached\par \par \par \par \par   [facility] Accession Number : [id number]\par \par JANES MOTA 2\par \par \par \par Cytopathology Report\par \par \par \par \par \par Final Diagnosis\par BONE, VERTEBRAL BODY, S3, CT GUIDED CORE BIOPSY AND FNA\par Cellular marrow with maturing trilineage hematopoiesis and mild\par plasmacytosis\par See note and description.\par \par Diagnostic note:  Per chart review, patient has lytic lesions in\par vertebral body S3. The current biopsy consists of fragmented\par marrow showing cellular marrow with maturing trilineage\par hematopoiesis and mild plasmacytosis. The clonality of the plasma\par cell population cannot be determined by kappaISH and lambdaISH\par studies. Immunohistochemistry work up for light chain restriction\par are in proces and will be reported as an addendum. Correlation\par with clinical findings (e.g.: serum immunoglobulin levels, SPEP,\par IF studies) and if clinically indicated a bone marrow biopsy with\par flow cytometry and cytogenetic analysis is recommended.\par \par Microscopic description:   Cytology slides, touch preps, cell\par block and histologic sections of core biopsy consists of small\par fragments of marrow elements with 40-50% cellularity showing\par maturing trilineage hematopoiesis with mild plasmacytosis. Plasma\par cells are mostly distributed perivascularly and some\par interstitially, occasional small groups are seen.\par \par Immunohistochemistry:  CD20, CD3, CD34, , AE1-3, ,\par KappaISH, lambdaISH, kappa and lambda, MPO, CD71, E-cad, CD15\par stains  performed on formalin fixed paraffin embedded tissue,\par block 1B.\par \par CD34: Highlights vascular structures, no increase in blast\par population\par : Highlights erythroid series and scattered masts cells. No\par increase in blast population\par CD20: Highlights small B cells scattered interstitially\par CD3: Highlights small T cells scattered interstitially\par AE1-3: Negative\par \par  stain highlights plasma cells forming small clusters,\par comprising less than 5% marrow cellularity. KappaISH and\par lambdaISH stains are non-contributory.\par \par Kappa and lambda light chain immunostains are in process and will\par be reported as an addendum.\par \par MPO, CD71, E-cad and CD15 immunostains are in process and will be\par reported as an addendum.\par \par \par \par \par \par JANES MOTA                          2\par \par \par \par Cytopathology Report\par \par \par \par \par \par Flow cytometry analysis:  Not submitted\par \par Screened by: Christina MERCER (ASCP)\par Verified by: Vicki Prajapati MD\par (Electronic Signature)\par Reported on: 07/10/19 12:55 EDT, 6 [facility], [place], [place]\par [address]\par Cytology technical processing performed at [address] [place] [id number]\par _________________________________________________________________\par \par \par Specimen(s) Submitted\par BONE, VERTEBRAL BODY, S3, CT GUIDED CORE BIOPSY AND FNA\par \par \par Clinical Information\par Spinal cord lesion. Rule out cancer. No history of cancer. Rule\par out cordoma.\par \par CT Chest, abdomen and pelvis (06/08/2019): Lytic lesions of S3\par vertebral body, lucency in posterior T 1,  anterior T7, and\par anterior T2 vertebral bodies are indeterminate, possible\par representing small hemangiomas versus additional lytic lesions\par \par \par Gross Description\par Core Biopsy:\par Tissue collected: Specimen container has been inspected to\par confirm patient?s name and date of birth, contains multiple tan\par cores measuring 0.3 - 1.0 cm in length (and multiple fragments).\par Entire specimen submitted in 2 cassette(s) labeled 1B and 1C.\par \par 1 Touch prep slides ( 1 air dried + 0 fixed)\par \par FNA:\par Received: 4 air dried and 4 fixed slides\par Needle rinses in 20 ml formalin\par Submitted: cell block in one cassette labeled 1A\par \par Prepared:\par 1 Touch Prep, 4 Diff Quick,  4 Pap Stained slides\par 1 cell block  labeled 1A\par 1 core biopsy labeled 1B\par 1 core biopsy labeled 1C\par 12 Total slides\par \par  \par \par  Ordered by: DOCUMENT, SCM       Collected/Examined: 02Jul2019 03:11PM       \par Verification Not Required       Stage: Final       \par  Performed at: [facility]       Resulted: 10Jul2019 12:55PM       Last Updated: 10Jul2019 12:56PM       Accession: [id number]        \par \par \par  Pathology             Final\par \par No Documents Attached\par \par \par \par \par   [facility] Accession Number : [id number]\par \par NAKULJANES ISAACS                          3\par \par \par \par Cytopathology Addendum Report\par \par \par \par \par Addendum\par For reporting additional immunostain results\par \par Immunohistochemistry:\par Kappa and lambda light chain stains looks polyclonal in this\par biopsy with high background staining.\par CD71: Highlights erythroid series\par E-cad: Negative\par MPO: Highlights myeloid series\par CD15: Highlights maturing myeloid series\par \par The diagnosis remains unchanged.\par \par Verified by: Vicki Prajapati MD\par (Electronic Signature)\par Reported on: 07/15/19 15:10 EDT, 6 [place], [place]\par [address]\par _________________________________________________________________\par \par \par Cytopathology Report\par \par \par \par \par \par Final Diagnosis\par BONE, VERTEBRAL BODY, S3, CT GUIDED CORE BIOPSY AND FNA\par Cellular marrow with maturing trilineage hematopoiesis and mild\par plasmacytosis\par See note and description.\par \par Diagnostic note:  Per chart review, patient has lytic lesions in\par vertebral body S3. The current biopsy consists of fragmented\par marrow showing cellular marrow with maturing trilineage\par hematopoiesis and mild plasmacytosis. The clonality of the plasma\par cell population cannot be determined by kappaISH and lambdaISH\par studies. Immunohistochemistry work up for light chain restriction\par are in proces and will be reported as an addendum. Correlation\par with clinical findings (e.g.: serum immunoglobulin levels, SPEP,\par IF studies) and if clinically indicated a bone marrow biopsy with\par flow cytometry and cytogenetic analysis is recommended.\par \par Microscopic description:   Cytology slides, touch preps, cell\par block and histologic sections of core biopsy consists of small\par fragments of marrow elements with 40-50% cellularity showing\par maturing trilineage hematopoiesis with mild\par \par \par \par \par \par JANES MOTA                          3\par \par \par \par Cytopathology Report\par \par \par \par \par plasmacytosis. Plasma cells are mostly distributed perivascularly\par and some interstitially, occasional small groups are seen.\par \par Immunohistochemistry:  CD20, CD3, CD34, , AE1-3, ,\par KappaISH, lambdaISH, kappa and lambda, MPO, CD71, E-cad, CD15\par stains  performed on formalin fixed paraffin embedded tissue,\par block 1B.\par \par CD34: Highlights vascular structures, no increase in blast\par population\par : Highlights erythroid series and scattered masts cells. No\par increase in blast population\par CD20: Highlights small B cells scattered interstitially\par CD3: Highlights small T cells scattered interstitially\par AE1-3: Negative\par \par  stain highlights plasma cells forming small clusters,\par comprising less than 5% marrow cellularity. KappaISH and\par lambdaISH stains are non-contributory.\par \par Kappa and lambda light chain immunostains are in process and will\par be reported as an addendum.\par \par MPO, CD71, E-cad and CD15 immunostains are in process and will be\par reported as an addendum.\par \par Flow cytometry analysis:  Not submitted\par \par Screened by: Christina MERCER (ASCP)\par Verified by: Vicki Prajapati MD\par (Electronic Signature)\par Reported on: 07/10/19 12:55 EDT, [address], [place]\par [address]\par Cytology technical processing performed at [address] [place] [address]\par _________________________________________________________________\par \par \par Specimen(s) Submitted\par BONE, VERTEBRAL BODY, S3, CT GUIDED CORE BIOPSY AND FNA\par \par \par Clinical Information\par Spinal cord lesion. Rule out cancer. No history of cancer. Rule\par out cordoma.\par \par \par \par \par \par \par \par NAKULJANES ISAACS                          3\par \par \par \par Cytopathology Report\par \par \par \par \par CT Chest, abdomen and pelvis (06/08/2019): Lytic lesions of S3\par vertebral body, lucency in posterior T 1,  anterior T7, and\par anterior T2 vertebral bodies are indeterminate, possible\par representing small hemangiomas versus additional lytic lesions\par \par \par Gross Description\par Core Biopsy:\par Tissue collected: Specimen container has been inspected to\par confirm patient?s name and date of birth, contains multiple tan\par cores measuring 0.3 - 1.0 cm in length (and multiple fragments).\par Entire specimen submitted in 2 cassette(s) labeled 1B and 1C.\par \par 1 Touch prep slides ( 1 air dried + 0 fixed)\par \par FNA:\par Received: 4 air dried and 4 fixed slides\par Needle rinses in 20 ml formalin\par Submitted: cell block in one cassette labeled 1A\par \par Prepared:\par 1 Touch Prep, 4 Diff Quick,  4 Pap Stained slides\par 1 cell block  labeled 1A\par 1 core biopsy labeled 1B\par 1 core biopsy labeled 1C\par 12 Total slides\par \par  \par \par  Ordered by: DOCUMENT, SCM       Collected/Examined: [id number] 03:11PM       \par Verification Not Required       Stage: Final       \par  Performed at: [facility]       Resulted: [id number] 03:10PM       Last Updated: [id number] 03:10PM       Accession: [id number]

## 2020-09-25 ENCOUNTER — OUTPATIENT (OUTPATIENT)
Dept: OUTPATIENT SERVICES | Facility: HOSPITAL | Age: 65
LOS: 1 days | Discharge: HOME | End: 2020-09-25

## 2020-09-25 DIAGNOSIS — C90.00 MULTIPLE MYELOMA NOT HAVING ACHIEVED REMISSION: ICD-10-CM

## 2020-10-05 ENCOUNTER — RESULT REVIEW (OUTPATIENT)
Age: 65
End: 2020-10-05

## 2020-10-05 ENCOUNTER — APPOINTMENT (OUTPATIENT)
Dept: HEMATOLOGY ONCOLOGY | Facility: CLINIC | Age: 65
End: 2020-10-05

## 2020-10-05 LAB
ALBUMIN SERPL ELPH-MCNC: 4.7 G/DL
ALP BLD-CCNC: 97 U/L
ALT SERPL-CCNC: 18 U/L
ANION GAP SERPL CALC-SCNC: 12 MMOL/L
AST SERPL-CCNC: 18 U/L
BASOPHILS # BLD AUTO: 0.07 K/UL — SIGNIFICANT CHANGE UP (ref 0–0.2)
BASOPHILS NFR BLD AUTO: 0.9 % — SIGNIFICANT CHANGE UP (ref 0–2)
BILIRUB SERPL-MCNC: 0.3 MG/DL
BUN SERPL-MCNC: 11 MG/DL
CALCIUM SERPL-MCNC: 9.4 MG/DL
CHLORIDE SERPL-SCNC: 106 MMOL/L
CO2 SERPL-SCNC: 25 MMOL/L
CREAT SERPL-MCNC: 0.68 MG/DL
EOSINOPHIL # BLD AUTO: 0.16 K/UL — SIGNIFICANT CHANGE UP (ref 0–0.5)
EOSINOPHIL NFR BLD AUTO: 2 % — SIGNIFICANT CHANGE UP (ref 0–6)
GLUCOSE SERPL-MCNC: 95 MG/DL
HCT VFR BLD CALC: 42.8 % — SIGNIFICANT CHANGE UP (ref 34.5–45)
HGB BLD-MCNC: 13.8 G/DL — SIGNIFICANT CHANGE UP (ref 11.5–15.5)
IMM GRANULOCYTES NFR BLD AUTO: 0.5 % — SIGNIFICANT CHANGE UP (ref 0–1.5)
LYMPHOCYTES # BLD AUTO: 3.12 K/UL — SIGNIFICANT CHANGE UP (ref 1–3.3)
LYMPHOCYTES # BLD AUTO: 38.3 % — SIGNIFICANT CHANGE UP (ref 13–44)
MCHC RBC-ENTMCNC: 29.1 PG — SIGNIFICANT CHANGE UP (ref 27–34)
MCHC RBC-ENTMCNC: 32.2 G/DL — SIGNIFICANT CHANGE UP (ref 32–36)
MCV RBC AUTO: 90.1 FL — SIGNIFICANT CHANGE UP (ref 80–100)
MONOCYTES # BLD AUTO: 0.45 K/UL — SIGNIFICANT CHANGE UP (ref 0–0.9)
MONOCYTES NFR BLD AUTO: 5.5 % — SIGNIFICANT CHANGE UP (ref 2–14)
NEUTROPHILS # BLD AUTO: 4.3 K/UL — SIGNIFICANT CHANGE UP (ref 1.8–7.4)
NEUTROPHILS NFR BLD AUTO: 52.8 % — SIGNIFICANT CHANGE UP (ref 43–77)
NRBC # BLD: 0 /100 WBCS — SIGNIFICANT CHANGE UP (ref 0–0)
PLATELET # BLD AUTO: 283 K/UL — SIGNIFICANT CHANGE UP (ref 150–400)
POTASSIUM SERPL-SCNC: 4.5 MMOL/L
PROT SERPL-MCNC: 7.1 G/DL
RBC # BLD: 4.75 M/UL — SIGNIFICANT CHANGE UP (ref 3.8–5.2)
RBC # FLD: 13.4 % — SIGNIFICANT CHANGE UP (ref 10.3–14.5)
SODIUM SERPL-SCNC: 142 MMOL/L
WBC # BLD: 8.14 K/UL — SIGNIFICANT CHANGE UP (ref 3.8–10.5)
WBC # FLD AUTO: 8.14 K/UL — SIGNIFICANT CHANGE UP (ref 3.8–10.5)

## 2020-10-06 LAB
ALBUMIN MFR SERPL ELPH: 57.9 %
ALBUMIN SERPL-MCNC: 4.1 G/DL
ALBUMIN/GLOB SERPL: 1.4 RATIO
ALPHA1 GLOB MFR SERPL ELPH: 4.1 %
ALPHA1 GLOB SERPL ELPH-MCNC: 0.3 G/DL
ALPHA2 GLOB MFR SERPL ELPH: 11.7 %
ALPHA2 GLOB SERPL ELPH-MCNC: 0.8 G/DL
B-GLOBULIN MFR SERPL ELPH: 18.4 %
B-GLOBULIN SERPL ELPH-MCNC: 1.3 G/DL
DEPRECATED KAPPA LC FREE/LAMBDA SER: 2.27 RATIO
DEPRECATED KAPPA LC FREE/LAMBDA SER: 2.27 RATIO
GAMMA GLOB FLD ELPH-MCNC: 0.6 G/DL
GAMMA GLOB MFR SERPL ELPH: 7.9 %
IGA SER QL IEP: 639 MG/DL
IGG SER QL IEP: 608 MG/DL
IGM SER QL IEP: 42 MG/DL
INTERPRETATION SERPL IEP-IMP: NORMAL
KAPPA LC CSF-MCNC: 0.91 MG/DL
KAPPA LC CSF-MCNC: 0.91 MG/DL
KAPPA LC SERPL-MCNC: 2.07 MG/DL
KAPPA LC SERPL-MCNC: 2.07 MG/DL
M PROTEIN MFR SERPL ELPH: 7.8 %
M PROTEIN SPEC IFE-MCNC: NORMAL
MONOCLON BAND OBS SERPL: 0.6 G/DL
PROT SERPL-MCNC: 7.1 G/DL
PROT SERPL-MCNC: 7.1 G/DL

## 2020-10-12 ENCOUNTER — APPOINTMENT (OUTPATIENT)
Dept: HEMATOLOGY ONCOLOGY | Facility: CLINIC | Age: 65
End: 2020-10-12
Payer: MEDICARE

## 2020-10-12 PROCEDURE — 99443: CPT | Mod: 95

## 2020-10-13 NOTE — ASSESSMENT
[FreeTextEntry1] : IgA Kappa MGUS\par BMBx 7/2019 c/w 9% plasmacytosis\par No e/o end organ damage\par PET/CT 7/2019 no increased avidity, soft tissue avidity R trochanter c/w enthesophyte. \par \par MRI spine done 11/29/2019\par IMPRESSION: \par Similar appearance of S3 osseous lesion extending into the epidural space, bilateral sacral forame\par and posterior pelvis as above. No new lesions identified.

## 2020-10-13 NOTE — HISTORY OF PRESENT ILLNESS
[Home] : at home, [unfilled] , at the time of the visit. [Medical Office: ([facility])___] : at the medical office located in  [Verbal consent obtained from patient] : the patient, [unfilled] [Disease:__________________________] : Disease: [unfilled] [Treatment Protocol] : Treatment Protocol [de-identified] : July 22, 2019\par "63 yo female with a recent hx of severe R pelvic pain seen by ortho in June and had MRI done of L-S spine done June 3rd showed S3 soft tissue lesion with epidural extension concerning for possible neoplasm or mets, CT imaging on June 8th showed S3 lytic lesion in vertebral body, lucencies in T2, T7, T11 possibly representing hemangiomas vs lytic lesions. Pt had biopsy done by IR on July 3rd, 2019, which was nondiagnostic. Showed polytypic plasma cells, comprising less than 5% of the cells. The bone marrow showed normal trilineage hematopoesis. Additional kappa and lambda restriction is in process. The pt was referred for hematology evaluation for concern of multiple myeloma. She is accompanied with  and dtr to the office visit. She was also advised to see Dr Hart for BMT and RadOnc (Dr Hdez).\par Pt reports marked improvement in R pelvic pain, now able to walk and move w/o significant discomfort. No intervention has been done for S3 lesion as yet. She has no hx of anemia or renal insufficiency or other areas of bone pain. She has no hx of weight loss or fatigue. " [de-identified] : int risk dz [de-identified] : \par  Pathology             Final\par \par No Documents Attached\par \par \par \par \par   [facility] Accession Number : [id number]\par \par JANES MOTA 2\par \par \par \par Cytopathology Report\par \par \par \par \par \par Final Diagnosis\par BONE, VERTEBRAL BODY, S3, CT GUIDED CORE BIOPSY AND FNA\par Cellular marrow with maturing trilineage hematopoiesis and mild\par plasmacytosis\par See note and description.\par \par Diagnostic note:  Per chart review, patient has lytic lesions in\par vertebral body S3. The current biopsy consists of fragmented\par marrow showing cellular marrow with maturing trilineage\par hematopoiesis and mild plasmacytosis. The clonality of the plasma\par cell population cannot be determined by kappaISH and lambdaISH\par studies. Immunohistochemistry work up for light chain restriction\par are in proces and will be reported as an addendum. Correlation\par with clinical findings (e.g.: serum immunoglobulin levels, SPEP,\par IF studies) and if clinically indicated a bone marrow biopsy with\par flow cytometry and cytogenetic analysis is recommended.\par \par Microscopic description:   Cytology slides, touch preps, cell\par block and histologic sections of core biopsy consists of small\par fragments of marrow elements with 40-50% cellularity showing\par maturing trilineage hematopoiesis with mild plasmacytosis. Plasma\par cells are mostly distributed perivascularly and some\par interstitially, occasional small groups are seen.\par \par Immunohistochemistry:  CD20, CD3, CD34, , AE1-3, ,\par KappaISH, lambdaISH, kappa and lambda, MPO, CD71, E-cad, CD15\par stains  performed on formalin fixed paraffin embedded tissue,\par block 1B.\par \par CD34: Highlights vascular structures, no increase in blast\par population\par : Highlights erythroid series and scattered masts cells. No\par increase in blast population\par CD20: Highlights small B cells scattered interstitially\par CD3: Highlights small T cells scattered interstitially\par AE1-3: Negative\par \par  stain highlights plasma cells forming small clusters,\par comprising less than 5% marrow cellularity. KappaISH and\par lambdaISH stains are non-contributory.\par \par Kappa and lambda light chain immunostains are in process and will\par be reported as an addendum.\par \par MPO, CD71, E-cad and CD15 immunostains are in process and will be\par reported as an addendum.\par \par \par \par \par \par JANES MOTA                          2\par \par \par \par Cytopathology Report\par \par \par \par \par \par Flow cytometry analysis:  Not submitted\par \par Screened by: Christina MERCER (ASCP)\par Verified by: Vicki Prajapati MD\par (Electronic Signature)\par Reported on: 07/10/19 12:55 EDT, 6 [facility], [place], [place]\par [address]\par Cytology technical processing performed at [address] [place] [id number]\par _________________________________________________________________\par \par \par Specimen(s) Submitted\par BONE, VERTEBRAL BODY, S3, CT GUIDED CORE BIOPSY AND FNA\par \par \par Clinical Information\par Spinal cord lesion. Rule out cancer. No history of cancer. Rule\par out cordoma.\par \par CT Chest, abdomen and pelvis (06/08/2019): Lytic lesions of S3\par vertebral body, lucency in posterior T 1,  anterior T7, and\par anterior T2 vertebral bodies are indeterminate, possible\par representing small hemangiomas versus additional lytic lesions\par \par \par Gross Description\par Core Biopsy:\par Tissue collected: Specimen container has been inspected to\par confirm patient?s name and date of birth, contains multiple tan\par cores measuring 0.3 - 1.0 cm in length (and multiple fragments).\par Entire specimen submitted in 2 cassette(s) labeled 1B and 1C.\par \par 1 Touch prep slides ( 1 air dried + 0 fixed)\par \par FNA:\par Received: 4 air dried and 4 fixed slides\par Needle rinses in 20 ml formalin\par Submitted: cell block in one cassette labeled 1A\par \par Prepared:\par 1 Touch Prep, 4 Diff Quick,  4 Pap Stained slides\par 1 cell block  labeled 1A\par 1 core biopsy labeled 1B\par 1 core biopsy labeled 1C\par 12 Total slides\par \par  \par \par  Ordered by: DOCUMENT, FRANKLIN       Collected/Examined: [id number] 03:11PM       \par Verification Not Required       Stage: Final       \par  Performed at: [facility]       Resulted: 10Jul2019 12:55PM       Last Updated: 10Jul2019 12:56PM       Accession: [id number]       \par  Pathology             Final\par \par No Documents Attached\par \par \par \par \par   [facility] Accession Number : [id number]\par \par JANES MOTA 2\par \par \par \par Cytopathology Report\par \par \par \par \par \par Final Diagnosis\par BONE, VERTEBRAL BODY, S3, CT GUIDED CORE BIOPSY AND FNA\par Cellular marrow with maturing trilineage hematopoiesis and mild\par plasmacytosis\par See note and description.\par \par Diagnostic note:  Per chart review, patient has lytic lesions in\par vertebral body S3. The current biopsy consists of fragmented\par marrow showing cellular marrow with maturing trilineage\par hematopoiesis and mild plasmacytosis. The clonality of the plasma\par cell population cannot be determined by kappaISH and lambdaISH\par studies. Immunohistochemistry work up for light chain restriction\par are in proces and will be reported as an addendum. Correlation\par with clinical findings (e.g.: serum immunoglobulin levels, SPEP,\par IF studies) and if clinically indicated a bone marrow biopsy with\par flow cytometry and cytogenetic analysis is recommended.\par \par Microscopic description:   Cytology slides, touch preps, cell\par block and histologic sections of core biopsy consists of small\par fragments of marrow elements with 40-50% cellularity showing\par maturing trilineage hematopoiesis with mild plasmacytosis. Plasma\par cells are mostly distributed perivascularly and some\par interstitially, occasional small groups are seen.\par \par Immunohistochemistry:  CD20, CD3, CD34, , AE1-3, ,\par KappaISH, lambdaISH, kappa and lambda, MPO, CD71, E-cad, CD15\par stains  performed on formalin fixed paraffin embedded tissue,\par block 1B.\par \par CD34: Highlights vascular structures, no increase in blast\par population\par : Highlights erythroid series and scattered masts cells. No\par increase in blast population\par CD20: Highlights small B cells scattered interstitially\par CD3: Highlights small T cells scattered interstitially\par AE1-3: Negative\par \par  stain highlights plasma cells forming small clusters,\par comprising less than 5% marrow cellularity. KappaISH and\par lambdaISH stains are non-contributory.\par \par Kappa and lambda light chain immunostains are in process and will\par be reported as an addendum.\par \par MPO, CD71, E-cad and CD15 immunostains are in process and will be\par reported as an addendum.\par \par \par \par \par \par JANES MOTA                          2\par \par \par \par Cytopathology Report\par \par \par \par \par \par Flow cytometry analysis:  Not submitted\par \par Screened by: Christina MERCER (ASCP)\par Verified by: Vicki Prajapati MD\par (Electronic Signature)\par Reported on: 07/10/19 12:55 EDT, 6 [facility], [place], [place]\par [id number]\par Cytology technical processing performed at [address] [place] [id number]\par _________________________________________________________________\par \par \par Specimen(s) Submitted\par BONE, VERTEBRAL BODY, S3, CT GUIDED CORE BIOPSY AND FNA\par \par \par Clinical Information\par Spinal cord lesion. Rule out cancer. No history of cancer. Rule\par out cordoma.\par \par CT Chest, abdomen and pelvis (06/08/2019): Lytic lesions of S3\par vertebral body, lucency in posterior T 1,  anterior T7, and\par anterior T2 vertebral bodies are indeterminate, possible\par representing small hemangiomas versus additional lytic lesions\par \par \par Gross Description\par Core Biopsy:\par Tissue collected: Specimen container has been inspected to\par confirm patient?s name and date of birth, contains multiple tan\par cores measuring 0.3 - 1.0 cm in length (and multiple fragments).\par Entire specimen submitted in 2 cassette(s) labeled 1B and 1C.\par \par 1 Touch prep slides ( 1 air dried + 0 fixed)\par \par FNA:\par Received: 4 air dried and 4 fixed slides\par Needle rinses in 20 ml formalin\par Submitted: cell block in one cassette labeled 1A\par \par Prepared:\par 1 Touch Prep, 4 Diff Quick,  4 Pap Stained slides\par 1 cell block  labeled 1A\par 1 core biopsy labeled 1B\par 1 core biopsy labeled 1C\par 12 Total slides\par \par  \par \par  Ordered by: DOCUMENT, SCM       Collected/Examined: [id number] 03:11PM       \par Verification Not Required       Stage: Final       \par  Performed at: [facility]       Resulted: [id number] 12:55PM       Last Updated: [id number] 12:56PM       Accession: [id number]        [FreeTextEntry1] : surveillance with labs and imaging [de-identified] : Aug 1, 2019van Edmonds and family are here for f/u of results and further recommendations. She has no new complaints today.\par \par -----------------------------------------------\par Nov 18, 2019van Edmonds is here with family today for routine f/u. Labs were done 2 weeks ago. She is c/o new L sided pelvic/hip pain with limitation in mobility. Her weight and appetite are stable. Otherwise no acute complaints.\par ----------------------------------------------\par March 5, 2020van Edmonds is here with  for scheduled appt. She reports stable health, no wt loss, no appetite changes, very active, no fatigue. Ocassional pain in L groin, no active back pain or pelvic pain at this time. \par -----------------------------------------------\par Jun 22, 2020van Edmonds is here for evaluation, she is in stable health, no acute complaints. \par -----------------------------------------------\christin Edmonds has no acute complaints, chronic OA pains at times but nothing new. Her appetite and weight are stable.

## 2020-10-13 NOTE — RESULTS/DATA
[FreeTextEntry1] : MRI spine done 11/29/2019\par IMPRESSION: \par Similar appearance of S3 osseous lesion extending into the epidural space, bilateral sacral forame\par and posterior pelvis as above. No new lesions identified. Differential again includes plasmacytom\par metastatic disease and less likely chordoma.\par \par  Pathology             Final\par \par No Documents Attached\par \par \par \par \par   [facility] : [id number]\par \par JANES MOTA                          3\par \par \par \par Hematopathology Report\par \par \par \par \par Final Diagnosis\par 1, 2. Bone marrow biopsy and bone marrow aspirate\par - Trilineage hematopoiesis with maturation and iron stores\par present\par - Monotypic plasma cells present\par - Negative for Congo red stains\par \par See note and description.\par \par Diagnostic note:\par Monotypic plasma cells are present (plasma cell count: < 10% by\par  stain, 0.9% by flow cytometry,  9% by smear count).\par Correlation with relevant clinical, laboratory and radiologic\par imaging is suggested. Comprehensive report with results of\par pending ancillary studies to follow.\par \par Dr. ISAIAH Suazo was notified of the diagnosis on 07/31/19\par 13:43.\par \par Ancillary studies\par Bone marrow aspirate iron stain: Iron stores are present; no ring\par sideroblasts are seen.\par Congo red stains (block 1A and 1B) are negative for amyloidosis.\par \par Flow cytometry:  Monotypic plasma cells (0.9% of cells), positive\par for cytoplasmic kappa, CD38, , dimmer CD45, ; negative\par CD19, CD20, CD56.\par Lymphocytes (19% of cells): Heterogeneous population of T-cells\par (with normal CD4 to CD8 ratio, including few natural killer-like\par T-cells), natural killer cells, and polytypic B-cells.\par Hematogones are present.\par \par Immunohistochemical stains:   stains (blocks 1A and 1B) show\par approximately 5 to 9% plasma cells with cyclin-D1 positivity. In-\par situ hybridization studies (block 1A) for cytoplasmic kappa and\par lambda show kappa staining restriction.\par \par Microscopic description:\par 1. Biopsy: Sections of bone marrow biopsy show normocellularity\par (50 to 55% cellularity), trilineage hematopoiesis with\par maturation, megakaryocytes normal in number and morphology, mild\par plasmacytosis and iron stores present.\par \par 2. Aspirate: Cellular spicules are present, adequate for\par interpretation.  Maturing and mature myeloid and erythroid\par elements are present with normal M:E ratio.  Megakaryocytes\par appear normal in number and morphology.\par \par \par \par \par \par \par NAKULJANES ISAACS                          3\par \par \par \par Hematopathology Report\par \par \par \par \par Bone Marrow Aspirate Differential: (200 Cells).\par Type            %    Normal*\par Blast                1%   0-3\par Neutrophil and\par Precursors        52%  47-74\par Eosinophil           4%   1-3\par Basophil        1%   0-1\par Pronormoblast        2%   0-2\par Normoblast           19%  15-25\par Monocyte        2%   0-1\par Lymphocyte           10%  5-15\par Plasma cell          9%   0-2\par *Adult Range\par \par Comment:  Iron stain (examined to evaluate for iron stores; see\par microscopic description) and Giemsa stain (shows appropriate\par staining pattern) are performed and evaluated on block(s): 1A,\par 1B.\par \par Built in immunohistochemical study control(s) associated with\par this case have been verified by the sign out pathologist.\par These immunohistochemical/ in-situ hybridization tests have been\par developed and their performance characteristics determined by\par [facility], Department of\par Pathology, Division of Immunopathology [facility]\par [place], [phone] [place] [address].  It has not\par been cleared or approved by the U.S. Food and Drug\par Administration.  The FDA has determined that such clearance or\par approval is not necessary.  This test is used for clinical\par purposes.  The laboratory is certified under the\par CLIA-88 as qualified to perform high complexity clinical testing.\par \par Verified by: Jaime Ramey M.D.\par (Electronic Signature)\par Reported on: 07/31/19 13:49 EDT, 6 [facility], [place], [place]\par [address]\par _________________________________________________________________\par \par Clinical History\par Rule out MM, amyloid congo red please\par CBC on 7/22/2019: WBC: 7.5 K/uL, HGB: 13.6 g/dL, MCV: 91.0 fl,\par Plt: 254 K/uL\par SPEP showed M-spike 0.5 g/dL, immunofixation of serum showed: IgA\par kappa\par \par Specimen(s) Submitted\par 1     Bone marrow biopsy\par 2     Bone marrow aspirate\par \par \par \par \par \par JANES MOTA\par \par \par \par Hematopathology Report\par \par \par \par \par \par Gross Description\par 1. The specimen is received in bouin's fixative and the specimen\par container is labeled: Right PIC bone marrow biopsy.  It consists\par of a 1.1 cm in length x 0.2 cm in diameter bone marrow core with\par a 3.0 x 2.8 x 0.4 cm aggregate of blood clot. Special stains are\par requested. Entirely submitted.  Two cassettes: 1A = bone marrow\par core; 1B = blood clot.\par \par 2. Two Lind-Giemsa stained bone marrow aspirate smears are\par submitted [10-FL-, ].\par \par In addition to other data that may appear on the specimen\par container, the label has been inspected to confirm the presence\par of the patient's name and date of birth.\par \par Sandrine Valencia 07/24/2019 16:09\par \par  \par \par  Ordered by: AME SUAZO       Collected/Examined: [id number] 10:45AM       \par Verified by: AME SUAZO [id number] 04:59PM       \par  Result Communication: No patient communication needed at this time;\par Stage: Final       \par  Performed at: [facility]       Resulted: [id number] 01:49PM       Last Updated: [id number] 04:59PM       Accession: [id number]      \par \par \par  Pathology             Final\par \par No Documents Attached\par \par \par \par \par   [facility] Accession Number : [id number]\par \par JANES MOTA 2\par \par \par \par Cytopathology Report\par \par \par \par \par \par Final Diagnosis\par BONE, VERTEBRAL BODY, S3, CT GUIDED CORE BIOPSY AND FNA\par Cellular marrow with maturing trilineage hematopoiesis and mild\par plasmacytosis\par See note and description.\par \par Diagnostic note:  Per chart review, patient has lytic lesions in\par vertebral body S3. The current biopsy consists of fragmented\par marrow showing cellular marrow with maturing trilineage\par hematopoiesis and mild plasmacytosis. The clonality of the plasma\par cell population cannot be determined by kappaISH and lambdaISH\par studies. Immunohistochemistry work up for light chain restriction\par are in proces and will be reported as an addendum. Correlation\par with clinical findings (e.g.: serum immunoglobulin levels, SPEP,\par IF studies) and if clinically indicated a bone marrow biopsy with\par flow cytometry and cytogenetic analysis is recommended.\par \par Microscopic description:   Cytology slides, touch preps, cell\par block and histologic sections of core biopsy consists of small\par fragments of marrow elements with 40-50% cellularity showing\par maturing trilineage hematopoiesis with mild plasmacytosis. Plasma\par cells are mostly distributed perivascularly and some\par interstitially, occasional small groups are seen.\par \par Immunohistochemistry:  CD20, CD3, CD34, , AE1-3, ,\par KappaISH, lambdaISH, kappa and lambda, MPO, CD71, E-cad, CD15\par stains  performed on formalin fixed paraffin embedded tissue,\par block 1B.\par \par CD34: Highlights vascular structures, no increase in blast\par population\par : Highlights erythroid series and scattered masts cells. No\par increase in blast population\par CD20: Highlights small B cells scattered interstitially\par CD3: Highlights small T cells scattered interstitially\par AE1-3: Negative\par \par  stain highlights plasma cells forming small clusters,\par comprising less than 5% marrow cellularity. KappaISH and\par lambdaISH stains are non-contributory.\par \par Kappa and lambda light chain immunostains are in process and will\par be reported as an addendum.\par \par MPO, CD71, E-cad and CD15 immunostains are in process and will be\par reported as an addendum.\par \par \par \par \par \par JANES MOTA                          2\par \par \par \par Cytopathology Report\par \par \par \par \par \par Flow cytometry analysis:  Not submitted\par \par Screened by: Christina MERCER (ASCP)\par Verified by: Vicki Prajapati MD\par (Electronic Signature)\par Reported on: 07/10/19 12:55 EDT, 6 [facility], [place], [place]\par [address]\par Cytology technical processing performed at [address] [place] [id number]\par _________________________________________________________________\par \par \par Specimen(s) Submitted\par BONE, VERTEBRAL BODY, S3, CT GUIDED CORE BIOPSY AND FNA\par \par \par Clinical Information\par Spinal cord lesion. Rule out cancer. No history of cancer. Rule\par out cordoma.\par \par CT Chest, abdomen and pelvis (06/08/2019): Lytic lesions of S3\par vertebral body, lucency in posterior T 1,  anterior T7, and\par anterior T2 vertebral bodies are indeterminate, possible\par representing small hemangiomas versus additional lytic lesions\par \par \par Gross Description\par Core Biopsy:\par Tissue collected: Specimen container has been inspected to\par confirm patient?s name and date of birth, contains multiple tan\par cores measuring 0.3 - 1.0 cm in length (and multiple fragments).\par Entire specimen submitted in 2 cassette(s) labeled 1B and 1C.\par \par 1 Touch prep slides ( 1 air dried + 0 fixed)\par \par FNA:\par Received: 4 air dried and 4 fixed slides\par Needle rinses in 20 ml formalin\par Submitted: cell block in one cassette labeled 1A\par \par Prepared:\par 1 Touch Prep, 4 Diff Quick,  4 Pap Stained slides\par 1 cell block  labeled 1A\par 1 core biopsy labeled 1B\par 1 core biopsy labeled 1C\par 12 Total slides\par \par  \par \par  Ordered by: DOCUMENT, SCM       Collected/Examined: 02Jul2019 03:11PM       \par Verification Not Required       Stage: Final       \par  Performed at: [facility]       Resulted: 10Jul2019 12:55PM       Last Updated: 10Jul2019 12:56PM       Accession: [id number]        \par \par \par  Pathology             Final\par \par No Documents Attached\par \par \par \par \par   [facility] Accession Number : [id number]\par \par NAKULJANES ISAACS                          3\par \par \par \par Cytopathology Addendum Report\par \par \par \par \par Addendum\par For reporting additional immunostain results\par \par Immunohistochemistry:\par Kappa and lambda light chain stains looks polyclonal in this\par biopsy with high background staining.\par CD71: Highlights erythroid series\par E-cad: Negative\par MPO: Highlights myeloid series\par CD15: Highlights maturing myeloid series\par \par The diagnosis remains unchanged.\par \par Verified by: Vicki Prajapati MD\par (Electronic Signature)\par Reported on: 07/15/19 15:10 EDT, 6 [place], [place]\par [address]\par _________________________________________________________________\par \par \par Cytopathology Report\par \par \par \par \par \par Final Diagnosis\par BONE, VERTEBRAL BODY, S3, CT GUIDED CORE BIOPSY AND FNA\par Cellular marrow with maturing trilineage hematopoiesis and mild\par plasmacytosis\par See note and description.\par \par Diagnostic note:  Per chart review, patient has lytic lesions in\par vertebral body S3. The current biopsy consists of fragmented\par marrow showing cellular marrow with maturing trilineage\par hematopoiesis and mild plasmacytosis. The clonality of the plasma\par cell population cannot be determined by kappaISH and lambdaISH\par studies. Immunohistochemistry work up for light chain restriction\par are in proces and will be reported as an addendum. Correlation\par with clinical findings (e.g.: serum immunoglobulin levels, SPEP,\par IF studies) and if clinically indicated a bone marrow biopsy with\par flow cytometry and cytogenetic analysis is recommended.\par \par Microscopic description:   Cytology slides, touch preps, cell\par block and histologic sections of core biopsy consists of small\par fragments of marrow elements with 40-50% cellularity showing\par maturing trilineage hematopoiesis with mild\par \par \par \par \par \par JANES MOTA                          3\par \par \par \par Cytopathology Report\par \par \par \par \par plasmacytosis. Plasma cells are mostly distributed perivascularly\par and some interstitially, occasional small groups are seen.\par \par Immunohistochemistry:  CD20, CD3, CD34, , AE1-3, ,\par KappaISH, lambdaISH, kappa and lambda, MPO, CD71, E-cad, CD15\par stains  performed on formalin fixed paraffin embedded tissue,\par block 1B.\par \par CD34: Highlights vascular structures, no increase in blast\par population\par : Highlights erythroid series and scattered masts cells. No\par increase in blast population\par CD20: Highlights small B cells scattered interstitially\par CD3: Highlights small T cells scattered interstitially\par AE1-3: Negative\par \par  stain highlights plasma cells forming small clusters,\par comprising less than 5% marrow cellularity. KappaISH and\par lambdaISH stains are non-contributory.\par \par Kappa and lambda light chain immunostains are in process and will\par be reported as an addendum.\par \par MPO, CD71, E-cad and CD15 immunostains are in process and will be\par reported as an addendum.\par \par Flow cytometry analysis:  Not submitted\par \par Screened by: Christina MERCER (ASCP)\par Verified by: Vicki Prajapati MD\par (Electronic Signature)\par Reported on: 07/10/19 12:55 EDT, [address], [place]\par [address]\par Cytology technical processing performed at [address] [place] [address]\par _________________________________________________________________\par \par \par Specimen(s) Submitted\par BONE, VERTEBRAL BODY, S3, CT GUIDED CORE BIOPSY AND FNA\par \par \par Clinical Information\par Spinal cord lesion. Rule out cancer. No history of cancer. Rule\par out cordoma.\par \par \par \par \par \par \par \par NAKULJANES ISAACS                          3\par \par \par \par Cytopathology Report\par \par \par \par \par CT Chest, abdomen and pelvis (06/08/2019): Lytic lesions of S3\par vertebral body, lucency in posterior T 1,  anterior T7, and\par anterior T2 vertebral bodies are indeterminate, possible\par representing small hemangiomas versus additional lytic lesions\par \par \par Gross Description\par Core Biopsy:\par Tissue collected: Specimen container has been inspected to\par confirm patient?s name and date of birth, contains multiple tan\par cores measuring 0.3 - 1.0 cm in length (and multiple fragments).\par Entire specimen submitted in 2 cassette(s) labeled 1B and 1C.\par \par 1 Touch prep slides ( 1 air dried + 0 fixed)\par \par FNA:\par Received: 4 air dried and 4 fixed slides\par Needle rinses in 20 ml formalin\par Submitted: cell block in one cassette labeled 1A\par \par Prepared:\par 1 Touch Prep, 4 Diff Quick,  4 Pap Stained slides\par 1 cell block  labeled 1A\par 1 core biopsy labeled 1B\par 1 core biopsy labeled 1C\par 12 Total slides\par \par  \par \par  Ordered by: DOCUMENT, SCM       Collected/Examined: [id number] 03:11PM       \par Verification Not Required       Stage: Final       \par  Performed at: [facility]       Resulted: [id number] 03:10PM       Last Updated: [id number] 03:10PM       Accession: [id number]

## 2020-10-13 NOTE — REVIEW OF SYSTEMS
[Joint Pain] : joint pain [Joint Stiffness] : joint stiffness [Negative] : Allergic/Immunologic [FreeTextEntry9] : chronic and stable

## 2020-10-27 ENCOUNTER — OUTPATIENT (OUTPATIENT)
Dept: OUTPATIENT SERVICES | Facility: HOSPITAL | Age: 65
LOS: 1 days | Discharge: HOME | End: 2020-10-27

## 2020-10-27 DIAGNOSIS — C90.00 MULTIPLE MYELOMA NOT HAVING ACHIEVED REMISSION: ICD-10-CM

## 2020-12-14 NOTE — ED PROVIDER NOTE - CROS ED ENMT ALL NEG
Health Maintenance Due   Topic Date Due    Aspirin/Antiplatelet Therapy  11/10/1972    Shingles Vaccine (2 of 3) 01/12/2015    TETANUS VACCINE  09/17/2019    Influenza Vaccine (1) 08/01/2020       Chart was reviewed for overdue Proactive [facility] Encounters (BIRDIE) topics (CRS, Breast Cancer Screening, Eye exam)  Health Maintenance has been updated.  LINKS immunization registry triggered.  LINKS not responding.     - - -

## 2021-01-26 ENCOUNTER — OUTPATIENT (OUTPATIENT)
Dept: OUTPATIENT SERVICES | Facility: HOSPITAL | Age: 66
LOS: 1 days | Discharge: HOME | End: 2021-01-26

## 2021-01-26 DIAGNOSIS — C90.00 MULTIPLE MYELOMA NOT HAVING ACHIEVED REMISSION: ICD-10-CM

## 2021-02-01 ENCOUNTER — APPOINTMENT (OUTPATIENT)
Dept: HEMATOLOGY ONCOLOGY | Facility: CLINIC | Age: 66
End: 2021-02-01

## 2021-02-03 ENCOUNTER — APPOINTMENT (OUTPATIENT)
Dept: HEMATOLOGY ONCOLOGY | Facility: CLINIC | Age: 66
End: 2021-02-03
Payer: MEDICARE

## 2021-02-03 ENCOUNTER — RESULT REVIEW (OUTPATIENT)
Age: 66
End: 2021-02-03

## 2021-02-03 ENCOUNTER — APPOINTMENT (OUTPATIENT)
Dept: HEMATOLOGY ONCOLOGY | Facility: CLINIC | Age: 66
End: 2021-02-03

## 2021-02-03 VITALS
HEART RATE: 84 BPM | TEMPERATURE: 98.3 F | WEIGHT: 176.37 LBS | OXYGEN SATURATION: 97 % | BODY MASS INDEX: 29.38 KG/M2 | DIASTOLIC BLOOD PRESSURE: 82 MMHG | RESPIRATION RATE: 16 BRPM | HEIGHT: 64.96 IN | SYSTOLIC BLOOD PRESSURE: 153 MMHG

## 2021-02-03 DIAGNOSIS — M89.9 DISORDER OF BONE, UNSPECIFIED: ICD-10-CM

## 2021-02-03 DIAGNOSIS — D47.2 MONOCLONAL GAMMOPATHY: ICD-10-CM

## 2021-02-03 LAB
BASOPHILS # BLD AUTO: 0.05 K/UL — SIGNIFICANT CHANGE UP (ref 0–0.2)
BASOPHILS NFR BLD AUTO: 0.6 % — SIGNIFICANT CHANGE UP (ref 0–2)
EOSINOPHIL # BLD AUTO: 0.07 K/UL — SIGNIFICANT CHANGE UP (ref 0–0.5)
EOSINOPHIL NFR BLD AUTO: 0.9 % — SIGNIFICANT CHANGE UP (ref 0–6)
HCT VFR BLD CALC: 44.5 % — SIGNIFICANT CHANGE UP (ref 34.5–45)
HGB BLD-MCNC: 14.3 G/DL — SIGNIFICANT CHANGE UP (ref 11.5–15.5)
IMM GRANULOCYTES NFR BLD AUTO: 0.5 % — SIGNIFICANT CHANGE UP (ref 0–1.5)
LYMPHOCYTES # BLD AUTO: 1.51 K/UL — SIGNIFICANT CHANGE UP (ref 1–3.3)
LYMPHOCYTES # BLD AUTO: 19 % — SIGNIFICANT CHANGE UP (ref 13–44)
MCHC RBC-ENTMCNC: 28.9 PG — SIGNIFICANT CHANGE UP (ref 27–34)
MCHC RBC-ENTMCNC: 32.1 G/DL — SIGNIFICANT CHANGE UP (ref 32–36)
MCV RBC AUTO: 89.9 FL — SIGNIFICANT CHANGE UP (ref 80–100)
MONOCYTES # BLD AUTO: 0.66 K/UL — SIGNIFICANT CHANGE UP (ref 0–0.9)
MONOCYTES NFR BLD AUTO: 8.3 % — SIGNIFICANT CHANGE UP (ref 2–14)
NEUTROPHILS # BLD AUTO: 5.62 K/UL — SIGNIFICANT CHANGE UP (ref 1.8–7.4)
NEUTROPHILS NFR BLD AUTO: 70.7 % — SIGNIFICANT CHANGE UP (ref 43–77)
NRBC # BLD: 0 /100 WBCS — SIGNIFICANT CHANGE UP (ref 0–0)
PLATELET # BLD AUTO: 259 K/UL — SIGNIFICANT CHANGE UP (ref 150–400)
RBC # BLD: 4.95 M/UL — SIGNIFICANT CHANGE UP (ref 3.8–5.2)
RBC # FLD: 13.8 % — SIGNIFICANT CHANGE UP (ref 10.3–14.5)
WBC # BLD: 7.95 K/UL — SIGNIFICANT CHANGE UP (ref 3.8–10.5)
WBC # FLD AUTO: 7.95 K/UL — SIGNIFICANT CHANGE UP (ref 3.8–10.5)

## 2021-02-03 PROCEDURE — 99214 OFFICE O/P EST MOD 30 MIN: CPT

## 2021-02-03 NOTE — ASSESSMENT
[FreeTextEntry1] : This is a 65 yoF with an IgA kappa MGUS - c/w intermediate risk MGUS. No e/o end organ damage (sacral lesion and other lesions did not light up by pet and S3 lesion was not a plasmacytoma by path) on labs or clinical exam. MRI done in Nov 2019 showed stable sacral lesion.Clinical exam remains stable and benign. Labs done 10/5/2020 shows stable M spike 0.6g.  \par \par -patient experiencing shoulder pain\par -still consistently has some lower back pain that bothers her hips, similar to what she originally had but it is not progressive and she has no weakness on exam\par -follow up M spike from today\par -consider repeat pet to ensure no plasmacytoma in the arm/shoulder, if not covered will order skeletal survey\par -advised patient can go in a month or two when covid numbers are hopefully lower. \par -return visit 4 months\par \par \par

## 2021-02-03 NOTE — HISTORY OF PRESENT ILLNESS
[de-identified] : 63 yo female with a recent hx of severe R pelvic pain seen by ortho in June and had MRI done of L-S spine done June 3rd showed S3 soft tissue lesion with epidural extension concerning for possible neoplasm or mets, CT imaging on June 8th showed S3 lytic lesion in vertebral body, lucencies in T2, T7, T11 possibly representing hemangiomas vs lytic lesions. PET did not show an avid lesions.  Pt had biopsy done by IR on July 3rd, 2019, which was nondiagnostic. Showed polytypic plasma cells, comprising less than 5% of the cells. The bone marrow showed normal trilineage hematopoesis. Additional kappa and lambda restriction is in process. The pt was referred for hematology evaluation for concern of multiple myeloma. She is accompanied with  and dtr to the office visit. She was also advised to see Dr Hart for BMT and RadOnc (Dr Hdez).\par \par \par  [de-identified] : -still has pain in the hips and the lower back\par -the pain used to be worse back in 2019 when she was first found to have the lesion\par -she is not taking any pain meidcatoin\par -she is taking an advil or tylenol if she needs to \par -occasional soulder into neck pain x 4 months, feels like it cracks when she moves it, pain comes and goes\par -dizzy when laying down\par

## 2021-02-08 ENCOUNTER — APPOINTMENT (OUTPATIENT)
Dept: HEMATOLOGY ONCOLOGY | Facility: CLINIC | Age: 66
End: 2021-02-08

## 2021-03-06 ENCOUNTER — APPOINTMENT (OUTPATIENT)
Dept: NUCLEAR MEDICINE | Facility: IMAGING CENTER | Age: 66
End: 2021-03-06
Payer: MEDICARE

## 2021-03-06 ENCOUNTER — OUTPATIENT (OUTPATIENT)
Dept: OUTPATIENT SERVICES | Facility: HOSPITAL | Age: 66
LOS: 1 days | End: 2021-03-06
Payer: MEDICARE

## 2021-03-06 ENCOUNTER — RESULT REVIEW (OUTPATIENT)
Age: 66
End: 2021-03-06

## 2021-03-06 DIAGNOSIS — M89.9 DISORDER OF BONE, UNSPECIFIED: ICD-10-CM

## 2021-03-06 PROCEDURE — 78816 PET IMAGE W/CT FULL BODY: CPT

## 2021-03-06 PROCEDURE — 78816 PET IMAGE W/CT FULL BODY: CPT | Mod: 26,PS,MH

## 2021-03-06 PROCEDURE — A9552: CPT

## 2021-03-31 LAB
ALBUMIN MFR SERPL ELPH: 59.2 %
ALBUMIN SERPL ELPH-MCNC: 4.5 G/DL
ALBUMIN SERPL-MCNC: 4.2 G/DL
ALBUMIN/GLOB SERPL: 1.4 RATIO
ALP BLD-CCNC: 94 U/L
ALPHA1 GLOB MFR SERPL ELPH: 4.6 %
ALPHA1 GLOB SERPL ELPH-MCNC: 0.3 G/DL
ALPHA2 GLOB MFR SERPL ELPH: 10.5 %
ALPHA2 GLOB SERPL ELPH-MCNC: 0.7 G/DL
ALT SERPL-CCNC: 23 U/L
ANION GAP SERPL CALC-SCNC: 13 MMOL/L
AST SERPL-CCNC: 22 U/L
B-GLOBULIN MFR SERPL ELPH: 18.1 %
B-GLOBULIN SERPL ELPH-MCNC: 1.3 G/DL
BILIRUB SERPL-MCNC: 0.2 MG/DL
BUN SERPL-MCNC: 11 MG/DL
CALCIUM SERPL-MCNC: 9.6 MG/DL
CHLORIDE SERPL-SCNC: 107 MMOL/L
CO2 SERPL-SCNC: 22 MMOL/L
CREAT SERPL-MCNC: 0.78 MG/DL
DEPRECATED KAPPA LC FREE/LAMBDA SER: 2.09 RATIO
GAMMA GLOB FLD ELPH-MCNC: 0.5 G/DL
GAMMA GLOB MFR SERPL ELPH: 7.6 %
GLUCOSE SERPL-MCNC: 107 MG/DL
IGA SER QL IEP: 624 MG/DL
IGG SER QL IEP: 658 MG/DL
IGM SER QL IEP: 41 MG/DL
INTERPRETATION SERPL IEP-IMP: NORMAL
KAPPA LC CSF-MCNC: 0.94 MG/DL
KAPPA LC SERPL-MCNC: 1.96 MG/DL
M PROTEIN MFR SERPL ELPH: 5.4 %
M PROTEIN SPEC IFE-MCNC: NORMAL
MONOCLON BAND OBS SERPL: 0.4 G/DL
POTASSIUM SERPL-SCNC: 4.2 MMOL/L
PROT SERPL-MCNC: 7.1 G/DL
SODIUM SERPL-SCNC: 142 MMOL/L

## 2021-05-24 ENCOUNTER — OUTPATIENT (OUTPATIENT)
Dept: OUTPATIENT SERVICES | Facility: HOSPITAL | Age: 66
LOS: 1 days | Discharge: HOME | End: 2021-05-24

## 2021-05-24 DIAGNOSIS — C90.00 MULTIPLE MYELOMA NOT HAVING ACHIEVED REMISSION: ICD-10-CM

## 2021-05-25 ENCOUNTER — APPOINTMENT (OUTPATIENT)
Dept: HEMATOLOGY ONCOLOGY | Facility: CLINIC | Age: 66
End: 2021-05-25

## 2021-05-25 ENCOUNTER — RESULT REVIEW (OUTPATIENT)
Age: 66
End: 2021-05-25

## 2021-05-25 LAB
BASOPHILS # BLD AUTO: 0.04 K/UL — SIGNIFICANT CHANGE UP (ref 0–0.2)
BASOPHILS NFR BLD AUTO: 0.6 % — SIGNIFICANT CHANGE UP (ref 0–2)
EOSINOPHIL # BLD AUTO: 0.13 K/UL — SIGNIFICANT CHANGE UP (ref 0–0.5)
EOSINOPHIL NFR BLD AUTO: 1.8 % — SIGNIFICANT CHANGE UP (ref 0–6)
HCT VFR BLD CALC: 42.7 % — SIGNIFICANT CHANGE UP (ref 34.5–45)
HGB BLD-MCNC: 13.9 G/DL — SIGNIFICANT CHANGE UP (ref 11.5–15.5)
IMM GRANULOCYTES NFR BLD AUTO: 0.7 % — SIGNIFICANT CHANGE UP (ref 0–1.5)
LYMPHOCYTES # BLD AUTO: 2.84 K/UL — SIGNIFICANT CHANGE UP (ref 1–3.3)
LYMPHOCYTES # BLD AUTO: 39.9 % — SIGNIFICANT CHANGE UP (ref 13–44)
MCHC RBC-ENTMCNC: 29.5 PG — SIGNIFICANT CHANGE UP (ref 27–34)
MCHC RBC-ENTMCNC: 32.6 G/DL — SIGNIFICANT CHANGE UP (ref 32–36)
MCV RBC AUTO: 90.7 FL — SIGNIFICANT CHANGE UP (ref 80–100)
MONOCYTES # BLD AUTO: 0.39 K/UL — SIGNIFICANT CHANGE UP (ref 0–0.9)
MONOCYTES NFR BLD AUTO: 5.5 % — SIGNIFICANT CHANGE UP (ref 2–14)
NEUTROPHILS # BLD AUTO: 3.66 K/UL — SIGNIFICANT CHANGE UP (ref 1.8–7.4)
NEUTROPHILS NFR BLD AUTO: 51.5 % — SIGNIFICANT CHANGE UP (ref 43–77)
NRBC # BLD: 0 /100 WBCS — SIGNIFICANT CHANGE UP (ref 0–0)
PLATELET # BLD AUTO: 271 K/UL — SIGNIFICANT CHANGE UP (ref 150–400)
RBC # BLD: 4.71 M/UL — SIGNIFICANT CHANGE UP (ref 3.8–5.2)
RBC # FLD: 13.9 % — SIGNIFICANT CHANGE UP (ref 10.3–14.5)
WBC # BLD: 7.11 K/UL — SIGNIFICANT CHANGE UP (ref 3.8–10.5)
WBC # FLD AUTO: 7.11 K/UL — SIGNIFICANT CHANGE UP (ref 3.8–10.5)

## 2021-06-01 ENCOUNTER — RESULT REVIEW (OUTPATIENT)
Age: 66
End: 2021-06-01

## 2021-06-01 ENCOUNTER — APPOINTMENT (OUTPATIENT)
Dept: HEMATOLOGY ONCOLOGY | Facility: CLINIC | Age: 66
End: 2021-06-01
Payer: MEDICARE

## 2021-06-01 VITALS
TEMPERATURE: 96.8 F | BODY MASS INDEX: 29.9 KG/M2 | SYSTOLIC BLOOD PRESSURE: 154 MMHG | WEIGHT: 179.43 LBS | OXYGEN SATURATION: 96 % | RESPIRATION RATE: 16 BRPM | DIASTOLIC BLOOD PRESSURE: 80 MMHG | HEART RATE: 55 BPM

## 2021-06-01 LAB
ALBUMIN MFR SERPL ELPH: 59 %
ALBUMIN SERPL ELPH-MCNC: 4.5 G/DL
ALBUMIN SERPL-MCNC: 4.2 G/DL
ALBUMIN/GLOB SERPL: 1.4 RATIO
ALP BLD-CCNC: 85 U/L
ALPHA1 GLOB MFR SERPL ELPH: 4.3 %
ALPHA1 GLOB SERPL ELPH-MCNC: 0.3 G/DL
ALPHA2 GLOB MFR SERPL ELPH: 10.8 %
ALPHA2 GLOB SERPL ELPH-MCNC: 0.8 G/DL
ALT SERPL-CCNC: 32 U/L
ANION GAP SERPL CALC-SCNC: 12 MMOL/L
AST SERPL-CCNC: 29 U/L
B-GLOBULIN MFR SERPL ELPH: 18.2 %
B-GLOBULIN SERPL ELPH-MCNC: 1.3 G/DL
BASOPHILS # BLD AUTO: 0.06 K/UL — SIGNIFICANT CHANGE UP (ref 0–0.2)
BASOPHILS NFR BLD AUTO: 0.8 % — SIGNIFICANT CHANGE UP (ref 0–2)
BILIRUB SERPL-MCNC: 0.3 MG/DL
BUN SERPL-MCNC: 14 MG/DL
CALCIUM SERPL-MCNC: 9.5 MG/DL
CHLORIDE SERPL-SCNC: 108 MMOL/L
CO2 SERPL-SCNC: 22 MMOL/L
CREAT SERPL-MCNC: 0.7 MG/DL
DEPRECATED KAPPA LC FREE/LAMBDA SER: 2.22 RATIO
DEPRECATED KAPPA LC FREE/LAMBDA SER: 2.22 RATIO
EOSINOPHIL # BLD AUTO: 0.15 K/UL — SIGNIFICANT CHANGE UP (ref 0–0.5)
EOSINOPHIL NFR BLD AUTO: 2 % — SIGNIFICANT CHANGE UP (ref 0–6)
GAMMA GLOB FLD ELPH-MCNC: 0.5 G/DL
GAMMA GLOB MFR SERPL ELPH: 7.7 %
GLUCOSE SERPL-MCNC: 99 MG/DL
HCT VFR BLD CALC: 41.5 % — SIGNIFICANT CHANGE UP (ref 34.5–45)
HGB BLD-MCNC: 13.9 G/DL — SIGNIFICANT CHANGE UP (ref 11.5–15.5)
IGA SER QL IEP: 583 MG/DL
IGA SER QL IEP: 583 MG/DL
IGG SER QL IEP: 665 MG/DL
IGM SER QL IEP: 44 MG/DL
IGM SER QL IEP: 44 MG/DL
IMM GRANULOCYTES NFR BLD AUTO: 1.5 % — SIGNIFICANT CHANGE UP (ref 0–1.5)
INTERPRETATION SERPL IEP-IMP: NORMAL
KAPPA LC CSF-MCNC: 0.87 MG/DL
KAPPA LC CSF-MCNC: 0.87 MG/DL
KAPPA LC SERPL-MCNC: 1.93 MG/DL
KAPPA LC SERPL-MCNC: 1.93 MG/DL
LYMPHOCYTES # BLD AUTO: 3.21 K/UL — SIGNIFICANT CHANGE UP (ref 1–3.3)
LYMPHOCYTES # BLD AUTO: 42.4 % — SIGNIFICANT CHANGE UP (ref 13–44)
M PROTEIN MFR SERPL ELPH: 5.9 %
M PROTEIN SPEC IFE-MCNC: NORMAL
MCHC RBC-ENTMCNC: 29.7 PG — SIGNIFICANT CHANGE UP (ref 27–34)
MCHC RBC-ENTMCNC: 33.5 G/DL — SIGNIFICANT CHANGE UP (ref 32–36)
MCV RBC AUTO: 88.7 FL — SIGNIFICANT CHANGE UP (ref 80–100)
MONOCLON BAND OBS SERPL: 0.4 G/DL
MONOCYTES # BLD AUTO: 0.41 K/UL — SIGNIFICANT CHANGE UP (ref 0–0.9)
MONOCYTES NFR BLD AUTO: 5.4 % — SIGNIFICANT CHANGE UP (ref 2–14)
NEUTROPHILS # BLD AUTO: 3.63 K/UL — SIGNIFICANT CHANGE UP (ref 1.8–7.4)
NEUTROPHILS NFR BLD AUTO: 47.9 % — SIGNIFICANT CHANGE UP (ref 43–77)
NRBC # BLD: 0 /100 WBCS — SIGNIFICANT CHANGE UP (ref 0–0)
PLATELET # BLD AUTO: 268 K/UL — SIGNIFICANT CHANGE UP (ref 150–400)
POTASSIUM SERPL-SCNC: 4.5 MMOL/L
PROT SERPL-MCNC: 7.1 G/DL
RBC # BLD: 4.68 M/UL — SIGNIFICANT CHANGE UP (ref 3.8–5.2)
RBC # FLD: 13.8 % — SIGNIFICANT CHANGE UP (ref 10.3–14.5)
SODIUM SERPL-SCNC: 142 MMOL/L
WBC # BLD: 7.57 K/UL — SIGNIFICANT CHANGE UP (ref 3.8–10.5)
WBC # FLD AUTO: 7.57 K/UL — SIGNIFICANT CHANGE UP (ref 3.8–10.5)

## 2021-06-01 PROCEDURE — 99072 ADDL SUPL MATRL&STAF TM PHE: CPT

## 2021-06-01 PROCEDURE — 99213 OFFICE O/P EST LOW 20 MIN: CPT

## 2021-06-01 RX ORDER — SODIUM PICOSULFATE, MAGNESIUM OXIDE, AND ANHYDROUS CITRIC ACID 10; 3.5; 12 MG/160ML; G/160ML; G/160ML
10-3.5-12 MG-GM LIQUID ORAL
Qty: 320 | Refills: 0 | Status: COMPLETED | COMMUNITY
Start: 2021-04-05

## 2021-06-01 RX ORDER — CHROMIUM 200 MCG
TABLET ORAL
Refills: 0 | Status: COMPLETED | COMMUNITY
End: 2021-06-01

## 2021-06-01 NOTE — HISTORY OF PRESENT ILLNESS
[de-identified] : 67 yo female with a recent hx of severe R pelvic pain seen by ortho in June and had MRI done of L-S spine done June 3rd showed S3 soft tissue lesion with epidural extension concerning for possible neoplasm or mets, CT imaging on June 8th showed S3 lytic lesion in vertebral body, lucencies in T2, T7, T11 possibly representing hemangiomas vs lytic lesions. PET did not show an avid lesions.  Pt had biopsy done by IR on July 3rd, 2019, which was nondiagnostic. Showed polytypic plasma cells, comprising less than 5% of the cells. The bone marrow showed normal trilineage hematopoesis. Additional kappa and lambda restriction is in process. The pt was referred for hematology evaluation for concern of multiple myeloma. She is accompanied with  and dtr to the office visit. She was also advised to see Dr Hart for BMT and RadOnc (Dr Hdez).\par \par \par  [de-identified] : -patient is feeling well\par -no new worseing back pain\par -had the pet ct\par -had shoulder stiffness that seems to be improving

## 2021-06-01 NOTE — ASSESSMENT
[FreeTextEntry1] : This is a 65 yoF with an IgA kappa MGUS - c/w intermediate risk MGUS. No e/o end organ damage (sacral lesion and other lesions did not light up by pet and S3 lesion was not a plasmacytoma by path) on labs or clinical exam. MRI done in Nov 2019 showed stable sacral lesion.Clinical exam remains stable and benign. \par \par -shoulder pain improved slightly \par - M spike stable\par -no evidence of disease on pet scan from 3/2021\par -still consistently has some lower back pain that bothers her hips, similar to what she originally had but it is not progressive and she has no weakness on exam\par -return visit 4 months\par

## 2021-09-28 ENCOUNTER — RESULT REVIEW (OUTPATIENT)
Age: 66
End: 2021-09-28

## 2021-09-28 ENCOUNTER — OUTPATIENT (OUTPATIENT)
Dept: OUTPATIENT SERVICES | Facility: HOSPITAL | Age: 66
LOS: 1 days | Discharge: HOME | End: 2021-09-28

## 2021-09-28 ENCOUNTER — APPOINTMENT (OUTPATIENT)
Dept: HEMATOLOGY ONCOLOGY | Facility: CLINIC | Age: 66
End: 2021-09-28

## 2021-09-28 DIAGNOSIS — C90.00 MULTIPLE MYELOMA NOT HAVING ACHIEVED REMISSION: ICD-10-CM

## 2021-09-28 LAB
BASOPHILS # BLD AUTO: 0 K/UL — SIGNIFICANT CHANGE UP (ref 0–0.2)
BASOPHILS NFR BLD AUTO: 0 % — SIGNIFICANT CHANGE UP (ref 0–2)
EOSINOPHIL # BLD AUTO: 0.23 K/UL — SIGNIFICANT CHANGE UP (ref 0–0.5)
EOSINOPHIL NFR BLD AUTO: 3 % — SIGNIFICANT CHANGE UP (ref 0–6)
HCT VFR BLD CALC: 39.7 % — SIGNIFICANT CHANGE UP (ref 34.5–45)
HGB BLD-MCNC: 13.3 G/DL — SIGNIFICANT CHANGE UP (ref 11.5–15.5)
LYMPHOCYTES # BLD AUTO: 3.99 K/UL — HIGH (ref 1–3.3)
LYMPHOCYTES # BLD AUTO: 52 % — HIGH (ref 13–44)
MCHC RBC-ENTMCNC: 29.8 PG — SIGNIFICANT CHANGE UP (ref 27–34)
MCHC RBC-ENTMCNC: 33.5 G/DL — SIGNIFICANT CHANGE UP (ref 32–36)
MCV RBC AUTO: 88.8 FL — SIGNIFICANT CHANGE UP (ref 80–100)
MONOCYTES # BLD AUTO: 0.46 K/UL — SIGNIFICANT CHANGE UP (ref 0–0.9)
MONOCYTES NFR BLD AUTO: 6 % — SIGNIFICANT CHANGE UP (ref 2–14)
NEUTROPHILS # BLD AUTO: 2.99 K/UL — SIGNIFICANT CHANGE UP (ref 1.8–7.4)
NEUTROPHILS NFR BLD AUTO: 39 % — LOW (ref 43–77)
NRBC # BLD: 0 /100 — SIGNIFICANT CHANGE UP (ref 0–0)
NRBC # BLD: SIGNIFICANT CHANGE UP /100 WBCS (ref 0–0)
PLAT MORPH BLD: NORMAL — SIGNIFICANT CHANGE UP
PLATELET # BLD AUTO: 259 K/UL — SIGNIFICANT CHANGE UP (ref 150–400)
RBC # BLD: 4.47 M/UL — SIGNIFICANT CHANGE UP (ref 3.8–5.2)
RBC # FLD: 13.2 % — SIGNIFICANT CHANGE UP (ref 10.3–14.5)
RBC BLD AUTO: SIGNIFICANT CHANGE UP
WBC # BLD: 7.67 K/UL — SIGNIFICANT CHANGE UP (ref 3.8–10.5)
WBC # FLD AUTO: 7.67 K/UL — SIGNIFICANT CHANGE UP (ref 3.8–10.5)

## 2021-11-27 ENCOUNTER — OUTPATIENT (OUTPATIENT)
Dept: OUTPATIENT SERVICES | Facility: HOSPITAL | Age: 66
LOS: 1 days | Discharge: HOME | End: 2021-11-27

## 2021-11-27 DIAGNOSIS — C90.00 MULTIPLE MYELOMA NOT HAVING ACHIEVED REMISSION: ICD-10-CM

## 2021-11-29 ENCOUNTER — LABORATORY RESULT (OUTPATIENT)
Age: 66
End: 2021-11-29

## 2021-11-30 ENCOUNTER — APPOINTMENT (OUTPATIENT)
Dept: HEMATOLOGY ONCOLOGY | Facility: CLINIC | Age: 66
End: 2021-11-30

## 2021-12-07 ENCOUNTER — APPOINTMENT (OUTPATIENT)
Dept: HEMATOLOGY ONCOLOGY | Facility: CLINIC | Age: 66
End: 2021-12-07
Payer: MEDICARE

## 2021-12-07 VITALS
RESPIRATION RATE: 16 BRPM | BODY MASS INDEX: 31.04 KG/M2 | TEMPERATURE: 98 F | WEIGHT: 186.29 LBS | DIASTOLIC BLOOD PRESSURE: 91 MMHG | HEIGHT: 64.96 IN | OXYGEN SATURATION: 95 % | HEART RATE: 61 BPM | SYSTOLIC BLOOD PRESSURE: 153 MMHG

## 2021-12-07 PROCEDURE — 99213 OFFICE O/P EST LOW 20 MIN: CPT

## 2021-12-07 NOTE — HISTORY OF PRESENT ILLNESS
[de-identified] : 67 yo female with a recent hx of severe R pelvic pain seen by ortho in June and had MRI done of L-S spine done June 3rd showed S3 soft tissue lesion with epidural extension concerning for possible neoplasm or mets, CT imaging on June 8th showed S3 lytic lesion in vertebral body, lucencies in T2, T7, T11 possibly representing hemangiomas vs lytic lesions. PET did not show an avid lesions.  Pt had biopsy done by IR on July 3rd, 2019, which was nondiagnostic. Showed polytypic plasma cells, comprising less than 5% of the cells. The bone marrow showed normal trilineage hematopoesis. Additional kappa and lambda restriction is in process. The pt was referred for hematology evaluation for concern of multiple myeloma. She is accompanied with  and dtr to the office visit. She was also advised to see Dr Hart for BMT and RadOnc (Dr Hdez).\par \par \par  [de-identified] : -patient is feeling well\par -back fatigue still there, when she stands for a long period of time she gets tired\par -constipation better with metamucil\par

## 2021-12-07 NOTE — ASSESSMENT
[FreeTextEntry1] : This is a 66 yoF with an IgA kappa MGUS - c/w intermediate risk MGUS. No e/o end organ damage (sacral lesion and other lesions did not light up by pet and S3 lesion was not a plasmacytoma by path) on labs or clinical exam. MRI done in Nov 2019 showed stable sacral lesion.Clinical exam remains stable and benign. \par PET/CT (7/26/19): No lytic or FDG avid bone lesion and 3/2021 sacral soft tissue lesion not seen\par \par -shoulder pain improved slightly \par - M spike stable\par -no evidence of disease on pet scan from 3/2021\par -still consistently has some lower back pain that bothers her hips, similar to what she originally had but it is not progressive and she has no weakness on exam, continue to monitor, consider PT, exercise/healthy lifestyle\par -return visit 6 months\par

## 2022-05-19 ENCOUNTER — NON-APPOINTMENT (OUTPATIENT)
Age: 67
End: 2022-05-19

## 2022-05-23 ENCOUNTER — NON-APPOINTMENT (OUTPATIENT)
Age: 67
End: 2022-05-23

## 2022-06-14 ENCOUNTER — APPOINTMENT (OUTPATIENT)
Dept: HEMATOLOGY ONCOLOGY | Facility: CLINIC | Age: 67
End: 2022-06-14

## 2022-06-14 LAB
ALBUMIN SERPL ELPH-MCNC: 4.8 G/DL
ALP BLD-CCNC: 87 U/L
ALT SERPL-CCNC: 24 U/L
ANION GAP SERPL CALC-SCNC: 13 MMOL/L
AST SERPL-CCNC: 19 U/L
B2 MICROGLOB SERPL-MCNC: 1.7 MG/L
BASOPHILS # BLD AUTO: 0.06 K/UL
BASOPHILS NFR BLD AUTO: 0.7 %
BILIRUB SERPL-MCNC: 0.4 MG/DL
BUN SERPL-MCNC: 9 MG/DL
CALCIUM SERPL-MCNC: 9.7 MG/DL
CHLORIDE SERPL-SCNC: 103 MMOL/L
CO2 SERPL-SCNC: 24 MMOL/L
CREAT SERPL-MCNC: 0.68 MG/DL
EGFR: 95 ML/MIN/1.73M2
EOSINOPHIL # BLD AUTO: 0.2 K/UL
EOSINOPHIL NFR BLD AUTO: 2.5 %
FERRITIN SERPL-MCNC: 105 NG/ML
GLUCOSE SERPL-MCNC: 96 MG/DL
HCT VFR BLD CALC: 43.5 %
HGB BLD-MCNC: 13.8 G/DL
IMM GRANULOCYTES NFR BLD AUTO: 0.5 %
LDH SERPL-CCNC: 158 U/L
LYMPHOCYTES # BLD AUTO: 3.29 K/UL
LYMPHOCYTES NFR BLD AUTO: 40.9 %
MAN DIFF?: NORMAL
MCHC RBC-ENTMCNC: 28.9 PG
MCHC RBC-ENTMCNC: 31.7 GM/DL
MCV RBC AUTO: 91.2 FL
MONOCYTES # BLD AUTO: 0.53 K/UL
MONOCYTES NFR BLD AUTO: 6.6 %
NEUTROPHILS # BLD AUTO: 3.93 K/UL
NEUTROPHILS NFR BLD AUTO: 48.8 %
PLATELET # BLD AUTO: 287 K/UL
POTASSIUM SERPL-SCNC: 4.5 MMOL/L
PROT SERPL-MCNC: 7.2 G/DL
RBC # BLD: 4.77 M/UL
RBC # FLD: 13.8 %
SODIUM SERPL-SCNC: 140 MMOL/L
WBC # FLD AUTO: 8.05 K/UL

## 2022-06-15 LAB — ERYTHROCYTE [SEDIMENTATION RATE] IN BLOOD BY WESTERGREN METHOD: 32 MM/HR

## 2022-06-16 LAB
DEPRECATED KAPPA LC FREE/LAMBDA SER: 1.91 RATIO
DEPRECATED KAPPA LC FREE/LAMBDA SER: 1.91 RATIO
IGA SER QL IEP: 600 MG/DL
IGG SER QL IEP: 565 MG/DL
IGM SER QL IEP: 33 MG/DL
KAPPA LC CSF-MCNC: 1.04 MG/DL
KAPPA LC CSF-MCNC: 1.04 MG/DL
KAPPA LC SERPL-MCNC: 1.99 MG/DL
KAPPA LC SERPL-MCNC: 1.99 MG/DL

## 2022-06-17 LAB
ALBUMIN MFR SERPL ELPH: 58.9 %
ALBUMIN SERPL-MCNC: 4.2 G/DL
ALBUMIN/GLOB SERPL: 1.4 RATIO
ALPHA1 GLOB MFR SERPL ELPH: 4.4 %
ALPHA1 GLOB SERPL ELPH-MCNC: 0.3 G/DL
ALPHA2 GLOB MFR SERPL ELPH: 11.2 %
ALPHA2 GLOB SERPL ELPH-MCNC: 0.8 G/DL
B-GLOBULIN MFR SERPL ELPH: 17.8 %
B-GLOBULIN SERPL ELPH-MCNC: 1.3 G/DL
GAMMA GLOB FLD ELPH-MCNC: 0.6 G/DL
GAMMA GLOB MFR SERPL ELPH: 7.7 %
INTERPRETATION SERPL IEP-IMP: NORMAL
M PROTEIN MFR SERPL ELPH: 5.9 %
M PROTEIN SPEC IFE-MCNC: NORMAL
MONOCLON BAND OBS SERPL: 0.4 G/DL
PROT SERPL-MCNC: 7.2 G/DL
PROT SERPL-MCNC: 7.2 G/DL

## 2022-06-21 ENCOUNTER — OUTPATIENT (OUTPATIENT)
Dept: OUTPATIENT SERVICES | Facility: HOSPITAL | Age: 67
LOS: 1 days | Discharge: HOME | End: 2022-06-21

## 2022-06-21 DIAGNOSIS — C90.00 MULTIPLE MYELOMA NOT HAVING ACHIEVED REMISSION: ICD-10-CM

## 2022-06-27 ENCOUNTER — APPOINTMENT (OUTPATIENT)
Dept: HEMATOLOGY ONCOLOGY | Facility: CLINIC | Age: 67
End: 2022-06-27

## 2022-06-30 ENCOUNTER — APPOINTMENT (OUTPATIENT)
Age: 67
End: 2022-06-30

## 2022-06-30 VITALS
TEMPERATURE: 97.8 F | OXYGEN SATURATION: 98 % | DIASTOLIC BLOOD PRESSURE: 93 MMHG | WEIGHT: 180.78 LBS | RESPIRATION RATE: 16 BRPM | SYSTOLIC BLOOD PRESSURE: 151 MMHG | HEART RATE: 66 BPM | HEIGHT: 64.96 IN | BODY MASS INDEX: 30.12 KG/M2

## 2022-06-30 DIAGNOSIS — M53.3 SACROCOCCYGEAL DISORDERS, NOT ELSEWHERE CLASSIFIED: ICD-10-CM

## 2022-06-30 PROCEDURE — 99214 OFFICE O/P EST MOD 30 MIN: CPT

## 2022-06-30 NOTE — HISTORY OF PRESENT ILLNESS
[de-identified] : 67F, PMHx basal cell carcinoma, HLD, returning for hematologic follow-up of IgA kappa MGUS.\par \par CASE SYNOPSIS:\par 3/23/2019–MRI lumbar spine: Suspicion of fracture involving S3 vertebral body for which dedicated MRI recommended.\par \par 3/28/2019: MRI bony pelvis/sacrum: Abnormality of the posterior S3 vertebral body nonspecific with adjacent soft tissue mass suspected within the thecal sac extending within bilateral neuroforamina at S3-S4; findings could represent a chordoma versus schwannoma.\par \par 6/3/2019–MRI lumbar spine: Abnormal signal and soft tissue associated with the S3 vertebral body with extension into the epidural space and the sacral neural foramina from S2-S4–5 likely represents osseous and epidural neoplasm; differential diagnosis of metastasis, myeloma or primary osseous lesion such as chordoma.\par \par 7/2/2019: CT-guided bone biopsy/FNA S3 vertebral body; cellular marrow with maturing trilineage hematopoiesis and mild plasmacytosis.\par \par 7/24/19: Bone marrow biopsy/aspirate: Trilineage hematopoiesis with maturation and iron stores present, monotypic plasma cell present <10%, negative for Congo red stain.  Flow cytometry 0.9%  stain positive, normal female karyotype, FISH panel–atypical CCN D1/IGH fusion in 4% of cells\par \par 7/26/2019- PET/CT: No lytic or FDG avid bone lesions.  No abnormal FDG activity or CT abnormality in the region of the posterior S3 vertebral body.\par \par 11/29/2019: MRI lumbar spine–similar appearance of S3 osseous lesion extending into the epidural space, bilateral sacral foramen and posterior pelvis.  No new lesions identified.  Differential again includes plasmacytoma, metastatic disease and less likely chordoma.\par \par 3/6/2021-PET/CT: No lytic or FDG avid bone lesions.  The sacral lesion extending into the epidural space on MRI is not visualized on this exam and there is no increased FDG avidity in the sacrum.\par \par 6/14/2022 SPEP–beta migrating paraprotein; M spike 0.4 g/dL [FreeTextEntry1] : expectant surveillance\par \par  [de-identified] : First visit/ encounter with me; previous patient of Melanie Suazo and Beth Isaac.\par Last seen in the office 6 months ago; in interim patient continued to present lower back pain, frequency urinating and a dry irritative cough with mild phlegm production.\par Patient tested several times negative for COVID.  She denies new hospitalization.  She has decreased the p.o. water intake due to frequency urinating, but denies evidence of urinary infections.  Continue follow-up in ambulatory with annual low-dose CT chest screening (former 1 pack a day x50 years smoker; DC smoking 2019).\par Denies arthralgias in other parts of the body. No reports of falls or fractures; ambulates without assistive devices.  Here accompanied by , Lon.

## 2022-06-30 NOTE — ASSESSMENT
[FreeTextEntry1] : Ms. MOTA 's questions were answered to her satisfaction. \par She  expressed her  understanding and willingness to comply with the above recommendations, and  will return to the office in 1 year.\par \par \par . \par \par

## 2022-06-30 NOTE — REVIEW OF SYSTEMS
[Joint Pain] : joint pain [Negative] : Neurological [Cough] : cough [FreeTextEntry6] : Nonproductive cough [FreeTextEntry9] : Lower back pain

## 2022-07-19 ENCOUNTER — APPOINTMENT (OUTPATIENT)
Dept: THORACIC SURGERY | Facility: CLINIC | Age: 67
End: 2022-07-19

## 2022-07-19 DIAGNOSIS — F17.200 NICOTINE DEPENDENCE, UNSPECIFIED, UNCOMPLICATED: ICD-10-CM

## 2022-07-19 PROCEDURE — G0296 VISIT TO DETERM LDCT ELIG: CPT

## 2022-07-20 VITALS — BODY MASS INDEX: 29.99 KG/M2 | WEIGHT: 180 LBS | HEIGHT: 65 IN

## 2022-07-20 PROBLEM — F17.200 CURRENT SMOKER: Noted: 2019-05-29

## 2022-07-20 NOTE — HISTORY OF PRESENT ILLNESS
[Home] : at home, [unfilled] , at the time of the visit. [Other Location: e.g. Home (Enter Location, City,State)___] : at [unfilled] [Verbal consent obtained from patient] : the patient, [unfilled] [Former] : former smoker [_____ pack-years] : [unfilled] pack-years [TextBox_13] : Referred by Dr. Ilya Jc.\par \par Ms. MOTA is a 67 year old female with a history of high cholesterol and skin cancer.\par \par She was called to review eligibility for Low-Dose CT lung cancer screening.  Reviewed and confirmed that the patient meets screening eligibility criteria:\par \par 67 years old \par \par Smoking Status: Former smoker \par \par Number of pack(s) per day: 1\par Number of years smoked: 30\par Number of pack years smokin\par \par Number of years since quitting smoking: 3\par Quit year: 2019\par \par Ms. MOTA denies any symptoms of lung cancer, including new cough, change in cough, hemoptysis, and unintentional weight loss.\par \par Ms. MOTA denies any personal history of lung cancer.  No lung cancer in a first degree relative.  Denies any history of lung disease or any history of occupational exposures. [TextBox_6] : 1 [TextBox_8] : 30 [TextBox_10] : 2019

## 2022-07-25 ENCOUNTER — APPOINTMENT (OUTPATIENT)
Dept: CT IMAGING | Facility: CLINIC | Age: 67
End: 2022-07-25

## 2022-07-25 PROCEDURE — 71271 CT THORAX LUNG CANCER SCR C-: CPT

## 2023-02-01 NOTE — ED PROVIDER NOTE - OBJECTIVE STATEMENT
No. YUSUF screening performed.  STOP BANG Legend: 0-2 = LOW Risk; 3-4 = INTERMEDIATE Risk; 5-8 = HIGH Risk 65 y/o F with c/o dizziness and weakness today while at a BBQ after taking her new BP medications.  Pt states she had some wine, ate and started to feel dizzy.  Pt's BP int  ED 80s/50s.

## 2023-02-24 ENCOUNTER — APPOINTMENT (OUTPATIENT)
Dept: MRI IMAGING | Facility: CLINIC | Age: 68
End: 2023-02-24
Payer: MEDICARE

## 2023-02-24 PROCEDURE — 70551 MRI BRAIN STEM W/O DYE: CPT | Mod: MH

## 2023-05-22 ENCOUNTER — OUTPATIENT (OUTPATIENT)
Dept: OUTPATIENT SERVICES | Facility: HOSPITAL | Age: 68
LOS: 1 days | Discharge: HOME | End: 2023-05-22

## 2023-05-22 DIAGNOSIS — C90.00 MULTIPLE MYELOMA NOT HAVING ACHIEVED REMISSION: ICD-10-CM

## 2023-05-24 ENCOUNTER — LABORATORY RESULT (OUTPATIENT)
Age: 68
End: 2023-05-24

## 2023-06-06 ENCOUNTER — APPOINTMENT (OUTPATIENT)
Age: 68
End: 2023-06-06
Payer: MEDICARE

## 2023-06-06 VITALS
OXYGEN SATURATION: 98 % | RESPIRATION RATE: 17 BRPM | HEART RATE: 60 BPM | SYSTOLIC BLOOD PRESSURE: 162 MMHG | DIASTOLIC BLOOD PRESSURE: 81 MMHG | BODY MASS INDEX: 29.01 KG/M2 | WEIGHT: 174.14 LBS | HEIGHT: 65 IN

## 2023-06-06 PROCEDURE — 99214 OFFICE O/P EST MOD 30 MIN: CPT

## 2023-06-06 NOTE — REVIEW OF SYSTEMS
[Cough] : cough [Negative] : Integumentary [Joint Pain] : no joint pain [Joint Stiffness] : joint stiffness [FreeTextEntry6] : Nonproductive cough [FreeTextEntry9] : lower back stiffness, no pain [de-identified] : right upper extremity tremor

## 2023-06-06 NOTE — HISTORY OF PRESENT ILLNESS
[de-identified] : 68F, PMHx basal cell carcinoma, HLD, returning for hematologic follow-up of IgA kappa MGUS.\par \par CASE SYNOPSIS:\par 3/23/2019–MRI lumbar spine: Suspicion of fracture involving S3 vertebral body for which dedicated MRI recommended.\par \par 3/28/2019: MRI bony pelvis/sacrum: Abnormality of the posterior S3 vertebral body nonspecific with adjacent soft tissue mass suspected within the thecal sac extending within bilateral neural foramina at S3-S4; findings could represent a chordoma versus schwannoma.\par \par 6/3/2019–MRI lumbar spine: Abnormal signal and soft tissue associated with the S3 vertebral body with extension into the epidural space and the sacral neural foramina from S2-S4–5 likely represents osseous and epidural neoplasm; differential diagnosis of metastasis, myeloma or primary osseous lesion such as chordoma.\par \par 7/2/2019: CT-guided bone biopsy/FNA S3 vertebral body; cellular marrow with maturing trilineage hematopoiesis and mild plasmacytosis.\par \par 7/24/19: Bone marrow biopsy/aspirate: Trilineage hematopoiesis with maturation and iron stores present, monotypic plasma cell present <10%, negative for Congo red stain.  Flow cytometry 0.9%  stain positive, normal female karyotype, FISH panel–atypical CCN D1/IGH fusion in 4% of cells\par \par 7/26/2019- PET/CT: No lytic or FDG avid bone lesions.  No abnormal FDG activity or CT abnormality in the region of the posterior S3 vertebral body.\par \par 11/29/2019: MRI lumbar spine–similar appearance of S3 osseous lesion extending into the epidural space, bilateral sacral foramen and posterior pelvis.  No new lesions identified.  Differential again includes plasmacytoma, metastatic disease and less likely chordoma.\par \par 3/6/2021-PET/CT: No lytic or FDG avid bone lesions.  The sacral lesion extending into the epidural space on MRI is not visualized on this exam and there is no increased FDG avidity in the sacrum.\par \par 6/14/2022 SPEP–beta migrating paraprotein; M spike 0.4 g/dL [FreeTextEntry1] : expectant surveillance\par \par  [de-identified] : Patient stable clinically reports no new constitutional symptoms.  Chronic back pain unchanged in character.  Denies hospitalization or recent COVID infection.  \par Recently developed right upper extremity tremor in the past few months; seeing Dr. Benito- neurology for possible diagnosis of Parkinson. \par MRI brain was unremarkable. No medications started yet.\par Recent blood work 5/24/2023 consistent with normal CBC slightly increased ESR 26 mm/h, total protein at 7.6 g/dL.\par Denies new imaging studies in this year.  Overall appears nontoxic.  No new images available for review.\par Accompanied by , Lon.

## 2023-06-07 LAB — M PROTEIN SPEC IFE-MCNC: NORMAL

## 2023-08-07 ENCOUNTER — NON-APPOINTMENT (OUTPATIENT)
Age: 68
End: 2023-08-07

## 2023-08-07 VITALS — BODY MASS INDEX: 28.99 KG/M2 | WEIGHT: 174 LBS | HEIGHT: 65 IN

## 2023-08-07 DIAGNOSIS — Z87.891 PERSONAL HISTORY OF NICOTINE DEPENDENCE: ICD-10-CM

## 2023-08-07 NOTE — HISTORY OF PRESENT ILLNESS
[Former] : Former [TextBox_13] : Ms. MOTA is a 68 year old female with a history of high cholesterol and skin cancer.  Reviewed and confirmed that the patient meets screening eligibility criteria:  68 years old Smoking Status: Former smoker Number of pack(s) per day: 1 Number of years smoked: 30 Number of pack years smokin Quit year:   No symptoms of lung cancer, including new cough, change in cough, hemoptysis, and unintentional weight loss. No personal history of lung cancer. No lung cancer in a first degree relative. No history of lung disease or  occupational exposures.   [YearQuit] : 2019 [PacksperYear] : 30

## 2023-08-09 ENCOUNTER — APPOINTMENT (OUTPATIENT)
Dept: CT IMAGING | Facility: CLINIC | Age: 68
End: 2023-08-09
Payer: MEDICARE

## 2023-08-09 PROCEDURE — 71271 CT THORAX LUNG CANCER SCR C-: CPT

## 2024-03-01 ENCOUNTER — APPOINTMENT (OUTPATIENT)
Dept: ORTHOPEDIC SURGERY | Facility: CLINIC | Age: 69
End: 2024-03-01
Payer: MEDICARE

## 2024-03-01 VITALS
WEIGHT: 180 LBS | DIASTOLIC BLOOD PRESSURE: 88 MMHG | BODY MASS INDEX: 29.99 KG/M2 | SYSTOLIC BLOOD PRESSURE: 153 MMHG | HEART RATE: 60 BPM | HEIGHT: 65 IN

## 2024-03-01 DIAGNOSIS — M48.062 SPINAL STENOSIS, LUMBAR REGION WITH NEUROGENIC CLAUDICATION: ICD-10-CM

## 2024-03-01 DIAGNOSIS — Z78.0 ASYMPTOMATIC MENOPAUSAL STATE: ICD-10-CM

## 2024-03-01 DIAGNOSIS — M25.559 PAIN IN UNSPECIFIED HIP: ICD-10-CM

## 2024-03-01 DIAGNOSIS — Z82.49 FAMILY HISTORY OF ISCHEMIC HEART DISEASE AND OTHER DISEASES OF THE CIRCULATORY SYSTEM: ICD-10-CM

## 2024-03-01 DIAGNOSIS — M54.9 DORSALGIA, UNSPECIFIED: ICD-10-CM

## 2024-03-01 DIAGNOSIS — Z86.39 PERSONAL HISTORY OF OTHER ENDOCRINE, NUTRITIONAL AND METABOLIC DISEASE: ICD-10-CM

## 2024-03-01 DIAGNOSIS — M76.31 ILIOTIBIAL BAND SYNDROME, RIGHT LEG: ICD-10-CM

## 2024-03-01 PROCEDURE — 73502 X-RAY EXAM HIP UNI 2-3 VIEWS: CPT | Mod: RT

## 2024-03-01 PROCEDURE — 72110 X-RAY EXAM L-2 SPINE 4/>VWS: CPT

## 2024-03-01 PROCEDURE — 20610 DRAIN/INJ JOINT/BURSA W/O US: CPT | Mod: RT

## 2024-03-01 PROCEDURE — 99204 OFFICE O/P NEW MOD 45 MIN: CPT | Mod: 25

## 2024-03-01 RX ORDER — PROPRANOLOL HYDROCHLORIDE 60 MG/1
60 CAPSULE, EXTENDED RELEASE ORAL
Refills: 0 | Status: ACTIVE | COMMUNITY

## 2024-03-01 RX ORDER — METHYLPRED ACET/NACL,ISO-OS/PF 40 MG/ML
40 VIAL (ML) INJECTION
Qty: 1 | Refills: 0 | Status: COMPLETED | OUTPATIENT
Start: 2024-03-01

## 2024-03-01 RX ORDER — TOPIRAMATE 25 MG/1
25 TABLET, FILM COATED ORAL
Refills: 0 | Status: ACTIVE | COMMUNITY

## 2024-03-01 RX ORDER — LIDOCAINE HYDROCHLORIDE 10 MG/ML
1 INJECTION, SOLUTION INFILTRATION; PERINEURAL
Refills: 0 | Status: COMPLETED | OUTPATIENT
Start: 2024-03-01

## 2024-03-01 RX ADMIN — METHYLPREDNISOLONE ACETATE 1 MG/ML: 40 INJECTION, SUSPENSION INTRA-ARTICULAR; INTRALESIONAL; INTRAMUSCULAR; SOFT TISSUE at 00:00

## 2024-03-01 RX ADMIN — LIDOCAINE HYDROCHLORIDE 4 %: 10 INJECTION, SOLUTION EPIDURAL; INFILTRATION; INTRACAUDAL; PERINEURAL at 00:00

## 2024-03-01 NOTE — HISTORY OF PRESENT ILLNESS
[de-identified] : JANES MOTA  is an 68 year-old female presents today with a chief complaint of right hip pain. Patient describes onset over the last number of months, but it may be going on even longer.   Patient points to the posterior hip and lower lumbar area as maximum point of tenderness.  She also has lateral hip tenderness to palpation and cannot lay on that side pain is typically 4/10 in severity, dull and achy but sometimes sharp in quality with certain activities. The pain will shoot down the posterolateral leg, sometimes all the way to the toes. Symptoms are exacerbated by certain motions, getting up from a chair, or even walking/standing. They are improved with rest, maybe ice to the area, massage, and modifications of daily routines.  She can walk for a longer period of time when pushing a shopping cart  To address the pathology, they have tried OTC medications/NSAIDs with some relief, even though short lasting. Exercise have been short lived and difficult to do due to pain. At this point the patient is quite frustrated by their condition. As duration of symptoms exceeds months to years, they are here for further evaluation and discussion of possible diagnoses and management, or injections. They deny any further trauma. No fever or chills, no signs of infection. Today, patient does not state any other associated signs or complains outside of those described.   A complete review of symptoms as well as past medical/surgical history, medications, allergies, social and family history, and other details of HPI and exam were reviewed per first visit intake form and updated accordingly. Additional and more relevant details are noted in further detail today.

## 2024-03-01 NOTE — PROCEDURE
[de-identified] : Injection: Right Greater Trochanter. Indication: Bursitis/ Inflammation   A discussion was had with the patient regarding this procedure and all questions were answered. All risks, benefits and alternatives were discussed. These included but were not limited to bleeding, infection, allergic reaction and reaccumulation of fluid. A timeout was done to ensure correct side and patient agreed to the procedure.  A [place] flash was created on the skin utilizing a plastic needle cap to flash the anticipated point of entry. Alcohol was used to clean the skin, and chloraprep was used to sterilize and prep the area in the greater trochanter at the point of maximum tenderness. Ethyl chloride spray was then used as a topical anesthetic. A 22-gauge needle was used to inject 4cc 1% lidocaine without epinephrine,  and 1cc of 40mg/ml methylprednisolone into the knee. A sterile bandage was then applied. The patient tolerated the procedure well.

## 2024-03-01 NOTE — PHYSICAL EXAM
[de-identified] : General Appearance / Station: Well developed, well nourished, in no acute distress Orientation: Oriented to person, place, and time Gait & Station: Ambulates without assistive device Neurologic: Normal leg sensation Cardiovascular: Warm extremity Lymphatics: No lymphedema Generalized Ligament Laxity: Normal Stiffness: Normal.   LUMBAR SPINE Nontender at midline lumbar spine.  Right paraspinal muscle tenderness to palpation exacerbated with leaning forward Straight leg raise: Negative Motor: 5/5 motor L2-S1 Sensation Intact. No paresthesias L2-S1     SYMPTOMATIC RIGHT HIP Range of motion: Painless internal and external rotation of the hip. Strength: Within Normal Limits. Negative Trendelenburg sign                                                                     FADIR: Negative Stinchfield: Negative Palpation: Tender at greater trochanter, Nontender SI joint                      RIGHT KNEE Alignment: Varus Skin: Normal. Effusion: None Quadriceps: Normal Range of motion: Normal PF crepitus: 1+ PF apprehension: None Patella / Patella Tendon: None Palpation: Nontender Meniscus signs: Negative    [de-identified] : Imaging: AP Pelvis and lateral views of the right hip show right hip with no significant joint space narrowing. There are no signs of fracture. The soft tissues appear unremarkable  Imagin views of the lumbar spine were obtained today that show degenerative changes and evidence of lumbar spine osteoarthritis facet arthropathy and foraminal stenosis. No fracture, or dislocation seen at this time. There is grade 1 anterolisthesis L4-5 with no no mobility of the lumbar spine seen on flexion/ extension views. No other obvious osseous abnormality. Otherwise unremarkable.

## 2024-03-01 NOTE — DISCUSSION/SUMMARY
[de-identified] : I discussed nonoperative treatment options for their lumbar stenosis and right greater trochanteric bursitis. We discussed nonoperative treatments options including activity modification, therapy, bracing, nonsteroidal anti-inflammatory medications, and injections. The patient would like to continue with nonoperative treatment and would like to proceed with activity modification, physical therapy,NSAIDS, steroid injection,  I discussed with them that I often prescribe an anti-inflammatory that should be taken once a day with meals to decrease pain and expedite symptom relief. They should not take this while also taking Aleve (Naprosyn), Motrin/ Advil (Ibuprofen), Toradol (ketoralac). They must stop taking it if they develops stomach pain, increased bleeding or bruising and they should follow-up with their primary care doctor for routine blood work including kidney function to monitor its effect. While it Is not a habit-forming substance, it should only  be taken as needed and to discontinue use once symptoms have resolved.  She would prefer to continue with over-the-counter medication  My cumulative time spent on this patients visit included: Preparation for the visit, review of the medical records, review of pertinent diagnostic studies, examination and counseling of the patient on the above diagnosis, treatment plan and prognosis, orders of diagnostic tests, medications and/or appropriate procedures and documentation in the medical records of todays visit.

## 2024-05-01 ENCOUNTER — OUTPATIENT (OUTPATIENT)
Dept: OUTPATIENT SERVICES | Facility: HOSPITAL | Age: 69
LOS: 1 days | Discharge: HOME | End: 2024-05-01

## 2024-05-01 DIAGNOSIS — C90.00 MULTIPLE MYELOMA NOT HAVING ACHIEVED REMISSION: ICD-10-CM

## 2024-05-02 ENCOUNTER — LABORATORY RESULT (OUTPATIENT)
Age: 69
End: 2024-05-02

## 2024-05-10 ENCOUNTER — APPOINTMENT (OUTPATIENT)
Dept: HEMATOLOGY ONCOLOGY | Facility: CLINIC | Age: 69
End: 2024-05-10
Payer: MEDICARE

## 2024-05-10 DIAGNOSIS — D47.2 MONOCLONAL GAMMOPATHY: ICD-10-CM

## 2024-05-10 PROCEDURE — 99214 OFFICE O/P EST MOD 30 MIN: CPT

## 2024-05-11 PROBLEM — D47.2 MGUS (MONOCLONAL GAMMOPATHY OF UNKNOWN SIGNIFICANCE): Status: ACTIVE | Noted: 2019-08-01

## 2024-05-11 NOTE — HISTORY OF PRESENT ILLNESS
[de-identified] : 68F, PMHx basal cell carcinoma, HLD, returning for hematologic follow-up of IgA kappa MGUS.  CASE SYNOPSIS: 3/23/2019-MRI lumbar spine: Suspicion of fracture involving S3 vertebral body for which dedicated MRI recommended.  3/28/2019: MRI bony pelvis/sacrum: Abnormality of the posterior S3 vertebral body nonspecific with adjacent soft tissue mass suspected within the thecal sac extending within bilateral neural foramina at S3-S4; findings could represent a chordoma versus schwannoma.  6/3/2019-MRI lumbar spine: Abnormal signal and soft tissue associated with the S3 vertebral body with extension into the epidural space and the sacral neural foramina from S2-S4-5 likely represents osseous and epidural neoplasm; differential diagnosis of metastasis, myeloma or primary osseous lesion such as chordoma.  7/2/2019: CT-guided bone biopsy/FNA S3 vertebral body; cellular marrow with maturing trilineage hematopoiesis and mild plasmacytosis.  7/24/19: Bone marrow biopsy/aspirate: Trilineage hematopoiesis with maturation and iron stores present, monotypic plasma cell present <10%, negative for Congo red stain.  Flow cytometry 0.9%  stain positive, normal female karyotype, FISH panel-atypical CCN D1/IGH fusion in 4% of cells  7/26/2019- PET/CT: No lytic or FDG avid bone lesions.  No abnormal FDG activity or CT abnormality in the region of the posterior S3 vertebral body.  11/29/2019: MRI lumbar spine-similar appearance of S3 osseous lesion extending into the epidural space, bilateral sacral foramen and posterior pelvis.  No new lesions identified.  Differential again includes plasmacytoma, metastatic disease and less likely chordoma.  3/6/2021-PET/CT: No lytic or FDG avid bone lesions.  The sacral lesion extending into the epidural space on MRI is not visualized on this exam and there is no increased FDG avidity in the sacrum.  6/14/2022 SPEP-beta migrating paraprotein; M spike 0.4 g/dL  5/02/2024 SPEP-beta migrating paraprotein; M spike 0.5 g/dL  [FreeTextEntry1] : expectant surveillance\par  \par   [de-identified] : Here for annual hematologic.  Since last encounter, patient has been stable clinically.  Complaining of right hip and lower back pain; was referred to PT, which unfortunately has not helped.  Patient was forced to discontinue PT as pain migrated to the higher back pain.  Has follow-up with orthopedics next Friday.  Reports unsteady gait, but no falls, and she does not use assistive devices.  Physical examination otherwise unchanged.  Patient had repeat blood work including protein studies  earlier this month; SPEP showed stable total protein, elevated beta-globulin follow, unchanged and an M spike of 0.5 g/dL, essentially unchanged in the past 5 years.  Ms. MOTA denies recent hospitalization.  Medication list reviewed.  Here accompanied by her .

## 2024-05-11 NOTE — REVIEW OF SYSTEMS
[Cough] : cough [Joint Stiffness] : joint stiffness [Negative] : Integumentary [Joint Pain] : no joint pain [FreeTextEntry6] : Nonproductive cough [FreeTextEntry9] : lower back pain [de-identified] : right upper extremity tremor

## 2024-05-11 NOTE — REASON FOR VISIT
[Follow-Up Visit] : a follow-up visit for [Spouse] : spouse [FreeTextEntry2] : IgA kappa MGUS (intermediate risk)

## 2024-05-17 ENCOUNTER — APPOINTMENT (OUTPATIENT)
Dept: ORTHOPEDIC SURGERY | Facility: CLINIC | Age: 69
End: 2024-05-17
Payer: MEDICARE

## 2024-05-17 PROCEDURE — 99213 OFFICE O/P EST LOW 20 MIN: CPT

## 2024-05-17 NOTE — DISCUSSION/SUMMARY
[de-identified] : I discussed nonoperative treatment options for their right greater trochanteric bursitis and back pain.  Both symptoms have improved and nearly completely resolved.  I recommend discontinuing with walking and activities of daily living.   My cumulative time spent on this patients visit included: Preparation for the visit, review of the medical records, review of pertinent diagnostic studies, examination and counseling of the patient on the above diagnosis, treatment plan and prognosis, orders of diagnostic tests, medications and/or appropriate procedures and documentation in the medical records of todays visit.

## 2024-05-17 NOTE — HISTORY OF PRESENT ILLNESS
[de-identified] : JANES MOTA  is an 68 year female  presents today for follow-up of right hip pain. At our last visit they had right hip greater trochanteric bursitis.  They underwent an injection and physical therapy and symptoms were improving however they develop thoracic back pain while doing physical therapy and discontinue physical therapy.  Her back pain is resolved.   No fever, chills, or signs of infection. Today, patient does not state any other associated signs or complaints outside of those described.

## 2024-05-25 ENCOUNTER — NON-APPOINTMENT (OUTPATIENT)
Age: 69
End: 2024-05-25

## 2024-05-27 ENCOUNTER — NON-APPOINTMENT (OUTPATIENT)
Age: 69
End: 2024-05-27

## 2024-06-25 ENCOUNTER — APPOINTMENT (OUTPATIENT)
Dept: MRI IMAGING | Facility: CLINIC | Age: 69
End: 2024-06-25

## 2024-06-25 PROCEDURE — 72148 MRI LUMBAR SPINE W/O DYE: CPT | Mod: MH

## 2024-06-30 ENCOUNTER — NON-APPOINTMENT (OUTPATIENT)
Age: 69
End: 2024-06-30

## 2024-07-01 ENCOUNTER — APPOINTMENT (OUTPATIENT)
Dept: OTOLARYNGOLOGY | Facility: CLINIC | Age: 69
End: 2024-07-01

## 2024-07-01 ENCOUNTER — NON-APPOINTMENT (OUTPATIENT)
Age: 69
End: 2024-07-01

## 2024-07-01 VITALS
BODY MASS INDEX: 29.07 KG/M2 | HEART RATE: 67 BPM | SYSTOLIC BLOOD PRESSURE: 162 MMHG | WEIGHT: 174.5 LBS | DIASTOLIC BLOOD PRESSURE: 88 MMHG | HEIGHT: 65 IN

## 2024-07-01 DIAGNOSIS — K21.9 GASTRO-ESOPHAGEAL REFLUX DISEASE W/OUT ESOPHAGITIS: ICD-10-CM

## 2024-07-01 DIAGNOSIS — R13.12 DYSPHAGIA, OROPHARYNGEAL PHASE: ICD-10-CM

## 2024-07-01 DIAGNOSIS — R05.9 COUGH, UNSPECIFIED: ICD-10-CM

## 2024-07-01 DIAGNOSIS — R25.1 TREMOR, UNSPECIFIED: ICD-10-CM

## 2024-07-01 DIAGNOSIS — J34.2 DEVIATED NASAL SEPTUM: ICD-10-CM

## 2024-07-01 DIAGNOSIS — J38.4 EDEMA OF LARYNX: ICD-10-CM

## 2024-07-01 DIAGNOSIS — J31.0 CHRONIC RHINITIS: ICD-10-CM

## 2024-07-01 PROCEDURE — 31575 DIAGNOSTIC LARYNGOSCOPY: CPT

## 2024-07-01 PROCEDURE — 99204 OFFICE O/P NEW MOD 45 MIN: CPT | Mod: 25

## 2024-07-01 RX ORDER — FAMOTIDINE 20 MG/1
20 TABLET, FILM COATED ORAL
Qty: 60 | Refills: 5 | Status: ACTIVE | COMMUNITY
Start: 2024-07-01 | End: 1900-01-01

## 2024-07-24 ENCOUNTER — APPOINTMENT (OUTPATIENT)
Dept: OTOLARYNGOLOGY | Facility: CLINIC | Age: 69
End: 2024-07-24
Payer: MEDICARE

## 2024-07-24 PROCEDURE — 92612 ENDOSCOPY SWALLOW (FEES) VID: CPT | Mod: GN

## 2024-07-24 PROCEDURE — 31579 LARYNGOSCOPY TELESCOPIC: CPT | Mod: GN

## 2024-07-29 ENCOUNTER — APPOINTMENT (OUTPATIENT)
Dept: OTOLARYNGOLOGY | Facility: CLINIC | Age: 69
End: 2024-07-29
Payer: MEDICARE

## 2024-07-29 VITALS — HEART RATE: 57 BPM | SYSTOLIC BLOOD PRESSURE: 151 MMHG | DIASTOLIC BLOOD PRESSURE: 80 MMHG

## 2024-07-29 DIAGNOSIS — K21.9 GASTRO-ESOPHAGEAL REFLUX DISEASE W/OUT ESOPHAGITIS: ICD-10-CM

## 2024-07-29 DIAGNOSIS — J31.0 CHRONIC RHINITIS: ICD-10-CM

## 2024-07-29 DIAGNOSIS — R05.9 COUGH, UNSPECIFIED: ICD-10-CM

## 2024-07-29 DIAGNOSIS — R13.12 DYSPHAGIA, OROPHARYNGEAL PHASE: ICD-10-CM

## 2024-07-29 DIAGNOSIS — J38.4 EDEMA OF LARYNX: ICD-10-CM

## 2024-07-29 PROCEDURE — 99213 OFFICE O/P EST LOW 20 MIN: CPT

## 2024-07-29 NOTE — CONSULT LETTER
[Dear  ___] : Dear  [unfilled], [Courtesy Letter:] : I had the pleasure of seeing your patient, [unfilled], in my office today. [Please see my note below.] : Please see my note below. [Consult Closing:] : Thank you very much for allowing me to participate in the care of this patient.  If you have any questions, please do not hesitate to contact me. [Sincerely,] : Sincerely, [FreeTextEntry3] :  Yevgeniy Zurita MD FACS

## 2024-07-29 NOTE — ASSESSMENT
[FreeTextEntry1] :  Reviewed and reconciled medications, allergies, PMHx, PSHx, SocHx, FMHx.  Patient with h/o cough, PND, and sore throat presents today for FEES and strobe results. Patient states that she barley coughs anymore since taking the Famotidine.   FEES: 07/24/24: -trace evidence of LPR noted across trials extending to the cricopharyngeus -no penetration of aspiration   Videostrobe: 07/24/24 -Secretions noted in the nasopharynx extending to the hypopharynx -Mildly reduced movement of the LTVF was observed as compared to the RTVF -Intermittent hyperfunction   Plan: FEES/Videostrobe results reviewed with patient. Continue taking Famotidine. Continue following the reflux diet. Greater than 50% of visit spent in discussion with patient. FU 4 months

## 2024-07-29 NOTE — ADDENDUM
[FreeTextEntry1] :  Documented by Ramya Pierce acting as scribe for Dr. Zurita on 07/29/2024. All Medical record entries made by the Scribe were at my, Dr. Zurita, direction and personally dictated by me on 07/29/2024 . I have reviewed the chart and agree that the record accurately reflects my personal performance of the history, physical exam, assessment and plan. I have also personally directed, reviewed, and agreed with the discharge instructions.

## 2024-07-29 NOTE — HISTORY OF PRESENT ILLNESS
[de-identified] : Patient with h/o cough, PND, and sore throat presents today for FEES and strobe results. Patient states that she barley coughs anymore since taking the Famotidine.

## 2024-08-26 ENCOUNTER — NON-APPOINTMENT (OUTPATIENT)
Age: 69
End: 2024-08-26

## 2024-08-26 VITALS — BODY MASS INDEX: 28.99 KG/M2 | WEIGHT: 174 LBS | HEIGHT: 65 IN

## 2024-08-26 DIAGNOSIS — Z87.891 PERSONAL HISTORY OF NICOTINE DEPENDENCE: ICD-10-CM

## 2024-08-26 NOTE — HISTORY OF PRESENT ILLNESS
[Former] : Former [TextBox_13] : Ms. MOTA is a 69 year old female with a history of high cholesterol and skin cancer.  Reviewed and confirmed that the patient meets screening eligibility criteria:  69 years old Smoking Status: Former smoker Number of pack(s) per day: 1 Number of years smoked: 30 Number of pack years smokin Quit year:   No symptoms of lung cancer, including new cough, change in cough, hemoptysis, and unintentional weight loss. No personal history of lung cancer. No lung cancer in a first degree relative. No history of lung disease or  occupational exposures.   [PacksperYear] : 30 [YearQuit] : 2019

## 2024-09-24 ENCOUNTER — APPOINTMENT (OUTPATIENT)
Dept: CT IMAGING | Facility: CLINIC | Age: 69
End: 2024-09-24

## 2024-10-17 ENCOUNTER — APPOINTMENT (OUTPATIENT)
Dept: CT IMAGING | Facility: CLINIC | Age: 69
End: 2024-10-17

## 2024-10-17 PROCEDURE — 71271 CT THORAX LUNG CANCER SCR C-: CPT

## 2024-11-04 ENCOUNTER — APPOINTMENT (OUTPATIENT)
Dept: OTOLARYNGOLOGY | Facility: CLINIC | Age: 69
End: 2024-11-04
Payer: MEDICARE

## 2024-11-04 VITALS
BODY MASS INDEX: 29.37 KG/M2 | WEIGHT: 176.25 LBS | HEART RATE: 54 BPM | DIASTOLIC BLOOD PRESSURE: 77 MMHG | SYSTOLIC BLOOD PRESSURE: 127 MMHG | HEIGHT: 65 IN

## 2024-11-04 DIAGNOSIS — J38.4 EDEMA OF LARYNX: ICD-10-CM

## 2024-11-04 DIAGNOSIS — R22.0 LOCALIZED SWELLING, MASS AND LUMP, HEAD: ICD-10-CM

## 2024-11-04 DIAGNOSIS — H61.22 IMPACTED CERUMEN, LEFT EAR: ICD-10-CM

## 2024-11-04 DIAGNOSIS — J34.2 DEVIATED NASAL SEPTUM: ICD-10-CM

## 2024-11-04 DIAGNOSIS — R22.1 LOCALIZED SWELLING, MASS AND LUMP, HEAD: ICD-10-CM

## 2024-11-04 DIAGNOSIS — J31.0 CHRONIC RHINITIS: ICD-10-CM

## 2024-11-04 DIAGNOSIS — R13.12 DYSPHAGIA, OROPHARYNGEAL PHASE: ICD-10-CM

## 2024-11-04 DIAGNOSIS — R05.9 COUGH, UNSPECIFIED: ICD-10-CM

## 2024-11-04 DIAGNOSIS — K21.9 GASTRO-ESOPHAGEAL REFLUX DISEASE W/OUT ESOPHAGITIS: ICD-10-CM

## 2024-11-04 PROCEDURE — 99213 OFFICE O/P EST LOW 20 MIN: CPT | Mod: 25

## 2024-11-04 PROCEDURE — 31575 DIAGNOSTIC LARYNGOSCOPY: CPT | Mod: 59

## 2024-11-04 PROCEDURE — 69210 REMOVE IMPACTED EAR WAX UNI: CPT | Mod: LT

## 2024-11-07 ENCOUNTER — APPOINTMENT (OUTPATIENT)
Dept: ULTRASOUND IMAGING | Facility: CLINIC | Age: 69
End: 2024-11-07
Payer: MEDICARE

## 2024-11-07 PROCEDURE — 76536 US EXAM OF HEAD AND NECK: CPT

## 2024-11-22 DIAGNOSIS — E04.1 NONTOXIC SINGLE THYROID NODULE: ICD-10-CM

## 2025-02-06 ENCOUNTER — RESULT REVIEW (OUTPATIENT)
Age: 70
End: 2025-02-06

## 2025-02-06 ENCOUNTER — OUTPATIENT (OUTPATIENT)
Dept: OUTPATIENT SERVICES | Facility: HOSPITAL | Age: 70
LOS: 1 days | End: 2025-02-06
Payer: MEDICARE

## 2025-02-06 ENCOUNTER — APPOINTMENT (OUTPATIENT)
Dept: ULTRASOUND IMAGING | Facility: IMAGING CENTER | Age: 70
End: 2025-02-06
Payer: MEDICARE

## 2025-02-06 DIAGNOSIS — E04.1 NONTOXIC SINGLE THYROID NODULE: ICD-10-CM

## 2025-02-06 PROCEDURE — 10005 FNA BX W/US GDN 1ST LES: CPT

## 2025-02-06 PROCEDURE — 88173 CYTOPATH EVAL FNA REPORT: CPT | Mod: 26

## 2025-02-06 PROCEDURE — 88173 CYTOPATH EVAL FNA REPORT: CPT

## 2025-02-10 LAB — NON-GYNECOLOGICAL CYTOLOGY STUDY: SIGNIFICANT CHANGE UP

## 2025-02-13 NOTE — ED ADULT NURSE NOTE - CHPI ED NUR DURATION
----- Message from Didier Velásquez MD sent at 2/12/2025  4:18 PM CST -----  Regarding: results  Please let this patient know that alkaline phosphatase level is currently stable.  Other liver enzymes are also stable.  She can continue to use Ofev in the meantime.    Thanks    Didier Velásquez MD  Pulmonary & Critical Care Medicine  ----- Message -----  From: Lab, Background User  Sent: 2/12/2025  12:28 AM CST  To: DAN[facility] Pulm Result Pool   Below result conveyed to pt via [facility].    [Initial Evaluation] : an initial evaluation today

## 2025-03-28 ENCOUNTER — APPOINTMENT (OUTPATIENT)
Dept: OTOLARYNGOLOGY | Facility: CLINIC | Age: 70
End: 2025-03-28
Payer: MEDICARE

## 2025-03-28 VITALS
HEART RATE: 51 BPM | WEIGHT: 175 LBS | DIASTOLIC BLOOD PRESSURE: 82 MMHG | HEIGHT: 65 IN | SYSTOLIC BLOOD PRESSURE: 143 MMHG | BODY MASS INDEX: 29.16 KG/M2

## 2025-03-28 DIAGNOSIS — H90.6 MIXED CONDUCTIVE AND SENSORINEURAL HEARING LOSS, BILATERAL: ICD-10-CM

## 2025-03-28 DIAGNOSIS — H61.22 IMPACTED CERUMEN, LEFT EAR: ICD-10-CM

## 2025-03-28 DIAGNOSIS — H93.11 TINNITUS, RIGHT EAR: ICD-10-CM

## 2025-03-28 DIAGNOSIS — E04.1 NONTOXIC SINGLE THYROID NODULE: ICD-10-CM

## 2025-03-28 DIAGNOSIS — K21.9 GASTRO-ESOPHAGEAL REFLUX DISEASE W/OUT ESOPHAGITIS: ICD-10-CM

## 2025-03-28 DIAGNOSIS — H91.8X3 OTHER SPECIFIED HEARING LOSS, BILATERAL: ICD-10-CM

## 2025-03-28 DIAGNOSIS — J31.0 CHRONIC RHINITIS: ICD-10-CM

## 2025-03-28 PROCEDURE — 92570 ACOUSTIC IMMITANCE TESTING: CPT

## 2025-03-28 PROCEDURE — 92557 COMPREHENSIVE HEARING TEST: CPT

## 2025-03-28 PROCEDURE — 99213 OFFICE O/P EST LOW 20 MIN: CPT | Mod: 25

## 2025-03-28 PROCEDURE — G0268 REMOVAL OF IMPACTED WAX MD: CPT | Mod: LT

## 2025-03-28 RX ORDER — AZELASTINE HYDROCHLORIDE AND FLUTICASONE PROPIONATE 137; 50 UG/1; UG/1
1 SPRAY, METERED NASAL
Qty: 0 | Refills: 0 | DISCHARGE
Start: 2025-03-28

## 2025-03-28 RX ORDER — AZELASTINE HYDROCHLORIDE AND FLUTICASONE PROPIONATE 137; 50 UG/1; UG/1
137-50 SPRAY, METERED NASAL
Qty: 1 | Refills: 5 | Status: ACTIVE | COMMUNITY
Start: 2025-03-28 | End: 1900-01-01

## 2025-05-20 ENCOUNTER — LABORATORY RESULT (OUTPATIENT)
Age: 70
End: 2025-05-20

## 2025-05-20 LAB
ALBUMIN SERPL ELPH-MCNC: 4.4 G/DL
ALP BLD-CCNC: 74 U/L
ALT SERPL-CCNC: 19 U/L
ANION GAP SERPL CALC-SCNC: 13 MMOL/L
AST SERPL-CCNC: 21 U/L
B2 MICROGLOB SERPL-MCNC: 1.8 MG/L
BILIRUB SERPL-MCNC: 0.3 MG/DL
BUN SERPL-MCNC: 15 MG/DL
CALCIUM SERPL-MCNC: 9.2 MG/DL
CHLORIDE SERPL-SCNC: 107 MMOL/L
CO2 SERPL-SCNC: 21 MMOL/L
CREAT SERPL-MCNC: 0.74 MG/DL
EGFRCR SERPLBLD CKD-EPI 2021: 86 ML/MIN/1.73M2
ERYTHROCYTE [SEDIMENTATION RATE] IN BLOOD BY WESTERGREN METHOD: 18 MM/HR
GLUCOSE SERPL-MCNC: 83 MG/DL
LDH SERPL-CCNC: 199 U/L
POTASSIUM SERPL-SCNC: 4.6 MMOL/L
PROT SERPL-MCNC: 6.5 G/DL
SODIUM SERPL-SCNC: 141 MMOL/L

## 2025-05-21 LAB
DEPRECATED KAPPA LC FREE/LAMBDA SER: 1.07 RATIO
IGA SERPL-MCNC: 453 MG/DL
IGG SERPL-MCNC: 496 MG/DL
IGM SERPL-MCNC: 31 MG/DL
KAPPA LC CSF-MCNC: 1.53 MG/DL
KAPPA LC SERPL-MCNC: 1.63 MG/DL

## 2025-05-23 LAB
ALBUMIN MFR SERPL ELPH: 61.4 %
ALBUMIN SERPL-MCNC: 4.1 G/DL
ALBUMIN/GLOB SERPL: 1.6 RATIO
ALPHA1 GLOB MFR SERPL ELPH: 4.3 %
ALPHA1 GLOB SERPL ELPH-MCNC: 0.3 G/DL
ALPHA2 GLOB MFR SERPL ELPH: 11.2 %
ALPHA2 GLOB SERPL ELPH-MCNC: 0.8 G/DL
B-GLOBULIN MFR SERPL ELPH: 16 %
B-GLOBULIN SERPL ELPH-MCNC: 1.1 G/DL
GAMMA GLOB FLD ELPH-MCNC: 0.5 G/DL
GAMMA GLOB MFR SERPL ELPH: 7.1 %
INTERPRETATION SERPL IEP-IMP: NORMAL
M PROTEIN MFR SERPL ELPH: 6.6 %
M PROTEIN SPEC IFE-MCNC: NORMAL
MONOCLON BAND OBS SERPL: 0.4 G/DL
PROT SERPL-MCNC: 6.7 G/DL
PROT SERPL-MCNC: 6.7 G/DL

## 2025-05-25 ENCOUNTER — OUTPATIENT (OUTPATIENT)
Dept: OUTPATIENT SERVICES | Facility: HOSPITAL | Age: 70
LOS: 1 days | Discharge: HOME | End: 2025-05-25

## 2025-05-25 DIAGNOSIS — C90.00 MULTIPLE MYELOMA NOT HAVING ACHIEVED REMISSION: ICD-10-CM

## 2025-05-29 ENCOUNTER — NON-APPOINTMENT (OUTPATIENT)
Age: 70
End: 2025-05-29

## 2025-05-30 ENCOUNTER — APPOINTMENT (OUTPATIENT)
Dept: HEMATOLOGY ONCOLOGY | Facility: CLINIC | Age: 70
End: 2025-05-30
Payer: MEDICARE

## 2025-05-30 VITALS
HEART RATE: 54 BPM | WEIGHT: 176.99 LBS | OXYGEN SATURATION: 97 % | BODY MASS INDEX: 29.45 KG/M2 | RESPIRATION RATE: 17 BRPM | DIASTOLIC BLOOD PRESSURE: 79 MMHG | TEMPERATURE: 97.9 F | SYSTOLIC BLOOD PRESSURE: 134 MMHG

## 2025-05-30 DIAGNOSIS — D47.2 MONOCLONAL GAMMOPATHY: ICD-10-CM

## 2025-05-30 PROCEDURE — 99214 OFFICE O/P EST MOD 30 MIN: CPT

## 2025-08-01 ENCOUNTER — APPOINTMENT (OUTPATIENT)
Dept: ULTRASOUND IMAGING | Facility: CLINIC | Age: 70
End: 2025-08-01
Payer: MEDICARE

## 2025-08-01 PROCEDURE — 76536 US EXAM OF HEAD AND NECK: CPT

## 2025-08-13 ENCOUNTER — APPOINTMENT (OUTPATIENT)
Dept: OTOLARYNGOLOGY | Facility: CLINIC | Age: 70
End: 2025-08-13
Payer: MEDICARE

## 2025-08-13 VITALS
HEART RATE: 60 BPM | HEIGHT: 65 IN | WEIGHT: 178 LBS | SYSTOLIC BLOOD PRESSURE: 175 MMHG | DIASTOLIC BLOOD PRESSURE: 94 MMHG | BODY MASS INDEX: 29.66 KG/M2

## 2025-08-13 DIAGNOSIS — H61.22 IMPACTED CERUMEN, LEFT EAR: ICD-10-CM

## 2025-08-13 DIAGNOSIS — J34.2 DEVIATED NASAL SEPTUM: ICD-10-CM

## 2025-08-13 DIAGNOSIS — E04.2 NONTOXIC MULTINODULAR GOITER: ICD-10-CM

## 2025-08-13 DIAGNOSIS — J31.0 CHRONIC RHINITIS: ICD-10-CM

## 2025-08-13 DIAGNOSIS — K21.9 GASTRO-ESOPHAGEAL REFLUX DISEASE W/OUT ESOPHAGITIS: ICD-10-CM

## 2025-08-13 PROCEDURE — 69210 REMOVE IMPACTED EAR WAX UNI: CPT | Mod: LT

## 2025-08-13 PROCEDURE — 99213 OFFICE O/P EST LOW 20 MIN: CPT | Mod: 25

## 2025-08-19 ENCOUNTER — INPATIENT (INPATIENT)
Facility: HOSPITAL | Age: 70
LOS: 2 days | Discharge: NURSING, REHAB OR LONG-TERM CARE | DRG: 948 | End: 2025-08-22
Attending: GENERAL PRACTICE | Admitting: INTERNAL MEDICINE
Payer: MEDICARE

## 2025-08-19 VITALS
DIASTOLIC BLOOD PRESSURE: 79 MMHG | TEMPERATURE: 100 F | OXYGEN SATURATION: 96 % | HEART RATE: 81 BPM | HEIGHT: 65 IN | RESPIRATION RATE: 18 BRPM | WEIGHT: 177.03 LBS | SYSTOLIC BLOOD PRESSURE: 133 MMHG

## 2025-08-19 DIAGNOSIS — Z95.818 PRESENCE OF OTHER CARDIAC IMPLANTS AND GRAFTS: Chronic | ICD-10-CM

## 2025-08-19 DIAGNOSIS — R26.2 DIFFICULTY IN WALKING, NOT ELSEWHERE CLASSIFIED: ICD-10-CM

## 2025-08-19 DIAGNOSIS — U07.1 COVID-19: ICD-10-CM

## 2025-08-19 DIAGNOSIS — R53.1 WEAKNESS: ICD-10-CM

## 2025-08-19 DIAGNOSIS — Z29.9 ENCOUNTER FOR PROPHYLACTIC MEASURES, UNSPECIFIED: ICD-10-CM

## 2025-08-19 LAB
ALBUMIN SERPL ELPH-MCNC: 3.2 G/DL — LOW (ref 3.3–5)
ALP SERPL-CCNC: 53 U/L — SIGNIFICANT CHANGE UP (ref 40–120)
ALT FLD-CCNC: 22 U/L — SIGNIFICANT CHANGE UP (ref 12–78)
ANION GAP SERPL CALC-SCNC: 8 MMOL/L — SIGNIFICANT CHANGE UP (ref 5–17)
APPEARANCE UR: CLEAR — SIGNIFICANT CHANGE UP
AST SERPL-CCNC: 42 U/L — HIGH (ref 15–37)
BASOPHILS # BLD AUTO: 0.03 K/UL — SIGNIFICANT CHANGE UP (ref 0–0.2)
BASOPHILS NFR BLD AUTO: 0.3 % — SIGNIFICANT CHANGE UP (ref 0–2)
BILIRUB SERPL-MCNC: 0.5 MG/DL — SIGNIFICANT CHANGE UP (ref 0.2–1.2)
BILIRUB UR-MCNC: NEGATIVE — SIGNIFICANT CHANGE UP
BUN SERPL-MCNC: 10 MG/DL — SIGNIFICANT CHANGE UP (ref 7–23)
CALCIUM SERPL-MCNC: 8.3 MG/DL — LOW (ref 8.5–10.1)
CHLORIDE SERPL-SCNC: 111 MMOL/L — HIGH (ref 96–108)
CO2 SERPL-SCNC: 19 MMOL/L — LOW (ref 22–31)
COLOR SPEC: YELLOW — SIGNIFICANT CHANGE UP
CREAT SERPL-MCNC: 0.41 MG/DL — LOW (ref 0.5–1.3)
DIFF PNL FLD: ABNORMAL
EGFR: 105 ML/MIN/1.73M2 — SIGNIFICANT CHANGE UP
EGFR: 105 ML/MIN/1.73M2 — SIGNIFICANT CHANGE UP
EOSINOPHIL # BLD AUTO: 0 K/UL — SIGNIFICANT CHANGE UP (ref 0–0.5)
EOSINOPHIL NFR BLD AUTO: 0 % — SIGNIFICANT CHANGE UP (ref 0–6)
FLUAV AG NPH QL: SIGNIFICANT CHANGE UP
FLUBV AG NPH QL: SIGNIFICANT CHANGE UP
GLUCOSE SERPL-MCNC: 98 MG/DL — SIGNIFICANT CHANGE UP (ref 70–99)
GLUCOSE UR QL: NEGATIVE MG/DL — SIGNIFICANT CHANGE UP
HCT VFR BLD CALC: 39 % — SIGNIFICANT CHANGE UP (ref 34.5–45)
HGB BLD-MCNC: 12.7 G/DL — SIGNIFICANT CHANGE UP (ref 11.5–15.5)
IMM GRANULOCYTES # BLD AUTO: 0.04 K/UL — SIGNIFICANT CHANGE UP (ref 0–0.07)
IMM GRANULOCYTES NFR BLD AUTO: 0.4 % — SIGNIFICANT CHANGE UP (ref 0–0.9)
KETONES UR QL: 15 MG/DL
LEUKOCYTE ESTERASE UR-ACNC: NEGATIVE — SIGNIFICANT CHANGE UP
LYMPHOCYTES # BLD AUTO: 1.22 K/UL — SIGNIFICANT CHANGE UP (ref 1–3.3)
LYMPHOCYTES NFR BLD AUTO: 12.4 % — LOW (ref 13–44)
MCHC RBC-ENTMCNC: 28.9 PG — SIGNIFICANT CHANGE UP (ref 27–34)
MCHC RBC-ENTMCNC: 32.6 G/DL — SIGNIFICANT CHANGE UP (ref 32–36)
MCV RBC AUTO: 88.8 FL — SIGNIFICANT CHANGE UP (ref 80–100)
MONOCYTES # BLD AUTO: 0.74 K/UL — SIGNIFICANT CHANGE UP (ref 0–0.9)
MONOCYTES NFR BLD AUTO: 7.5 % — SIGNIFICANT CHANGE UP (ref 2–14)
NEUTROPHILS # BLD AUTO: 7.84 K/UL — HIGH (ref 1.8–7.4)
NEUTROPHILS NFR BLD AUTO: 79.4 % — HIGH (ref 43–77)
NITRITE UR-MCNC: NEGATIVE — SIGNIFICANT CHANGE UP
NRBC # BLD AUTO: 0 K/UL — SIGNIFICANT CHANGE UP (ref 0–0)
NRBC # FLD: 0 K/UL — SIGNIFICANT CHANGE UP (ref 0–0)
NRBC BLD AUTO-RTO: 0 /100 WBCS — SIGNIFICANT CHANGE UP (ref 0–0)
PH UR: 5.5 — SIGNIFICANT CHANGE UP (ref 5–8)
PLATELET # BLD AUTO: 165 K/UL — SIGNIFICANT CHANGE UP (ref 150–400)
PMV BLD: 10.8 FL — SIGNIFICANT CHANGE UP (ref 7–13)
POTASSIUM SERPL-MCNC: 4.9 MMOL/L — SIGNIFICANT CHANGE UP (ref 3.5–5.3)
POTASSIUM SERPL-SCNC: 4.9 MMOL/L — SIGNIFICANT CHANGE UP (ref 3.5–5.3)
PROCALCITONIN SERPL-MCNC: 0.12 NG/ML — HIGH
PROT SERPL-MCNC: 6.9 G/DL — SIGNIFICANT CHANGE UP (ref 6–8.3)
PROT UR-MCNC: 100 MG/DL
RBC # BLD: 4.39 M/UL — SIGNIFICANT CHANGE UP (ref 3.8–5.2)
RBC # FLD: 13.4 % — SIGNIFICANT CHANGE UP (ref 10.3–14.5)
RSV RNA NPH QL NAA+NON-PROBE: SIGNIFICANT CHANGE UP
SARS-COV-2 RNA SPEC QL NAA+PROBE: DETECTED
SODIUM SERPL-SCNC: 138 MMOL/L — SIGNIFICANT CHANGE UP (ref 135–145)
SOURCE RESPIRATORY: SIGNIFICANT CHANGE UP
SP GR SPEC: 1.01 — SIGNIFICANT CHANGE UP (ref 1–1.03)
TROPONIN I, HIGH SENSITIVITY RESULT: 9.6 NG/L — SIGNIFICANT CHANGE UP
UROBILINOGEN FLD QL: 0.2 MG/DL — SIGNIFICANT CHANGE UP (ref 0.2–1)
WBC # BLD: 9.87 K/UL — SIGNIFICANT CHANGE UP (ref 3.8–10.5)
WBC # FLD AUTO: 9.87 K/UL — SIGNIFICANT CHANGE UP (ref 3.8–10.5)

## 2025-08-19 PROCEDURE — 93005 ELECTROCARDIOGRAM TRACING: CPT

## 2025-08-19 PROCEDURE — 99223 1ST HOSP IP/OBS HIGH 75: CPT | Mod: GC

## 2025-08-19 PROCEDURE — 71045 X-RAY EXAM CHEST 1 VIEW: CPT | Mod: 26

## 2025-08-19 PROCEDURE — 71250 CT THORAX DX C-: CPT

## 2025-08-19 PROCEDURE — 36415 COLL VENOUS BLD VENIPUNCTURE: CPT

## 2025-08-19 PROCEDURE — 81001 URINALYSIS AUTO W/SCOPE: CPT

## 2025-08-19 PROCEDURE — 99285 EMERGENCY DEPT VISIT HI MDM: CPT

## 2025-08-19 PROCEDURE — 71250 CT THORAX DX C-: CPT | Mod: 26

## 2025-08-19 PROCEDURE — G0545: CPT

## 2025-08-19 PROCEDURE — 87637 SARSCOV2&INF A&B&RSV AMP PRB: CPT

## 2025-08-19 PROCEDURE — 84145 PROCALCITONIN (PCT): CPT

## 2025-08-19 PROCEDURE — 85025 COMPLETE CBC W/AUTO DIFF WBC: CPT

## 2025-08-19 PROCEDURE — 71045 X-RAY EXAM CHEST 1 VIEW: CPT

## 2025-08-19 PROCEDURE — 84484 ASSAY OF TROPONIN QUANT: CPT

## 2025-08-19 PROCEDURE — 80053 COMPREHEN METABOLIC PANEL: CPT

## 2025-08-19 PROCEDURE — 99222 1ST HOSP IP/OBS MODERATE 55: CPT

## 2025-08-19 PROCEDURE — 93010 ELECTROCARDIOGRAM REPORT: CPT

## 2025-08-19 RX ORDER — BENZONATATE 100 MG
100 CAPSULE ORAL EVERY 8 HOURS
Refills: 0 | Status: DISCONTINUED | OUTPATIENT
Start: 2025-08-19 | End: 2025-08-22

## 2025-08-19 RX ORDER — MAGNESIUM, ALUMINUM HYDROXIDE 200-200 MG
30 TABLET,CHEWABLE ORAL EVERY 4 HOURS
Refills: 0 | Status: DISCONTINUED | OUTPATIENT
Start: 2025-08-19 | End: 2025-08-22

## 2025-08-19 RX ORDER — MELATONIN 5 MG
3 TABLET ORAL AT BEDTIME
Refills: 0 | Status: DISCONTINUED | OUTPATIENT
Start: 2025-08-19 | End: 2025-08-22

## 2025-08-19 RX ORDER — TOPIRAMATE 25 MG/1
25 TABLET, FILM COATED ORAL AT BEDTIME
Refills: 0 | Status: DISCONTINUED | OUTPATIENT
Start: 2025-08-19 | End: 2025-08-22

## 2025-08-19 RX ORDER — ACETAMINOPHEN 500 MG/5ML
1000 LIQUID (ML) ORAL ONCE
Refills: 0 | Status: COMPLETED | OUTPATIENT
Start: 2025-08-19 | End: 2025-08-19

## 2025-08-19 RX ORDER — ONDANSETRON HCL/PF 4 MG/2 ML
4 VIAL (ML) INJECTION EVERY 8 HOURS
Refills: 0 | Status: DISCONTINUED | OUTPATIENT
Start: 2025-08-19 | End: 2025-08-22

## 2025-08-19 RX ORDER — TOPIRAMATE 25 MG/1
1 TABLET, FILM COATED ORAL
Refills: 0 | DISCHARGE

## 2025-08-19 RX ORDER — DEXTROMETHORPHAN HBR, GUAIFENESIN 200 MG/10ML
200 LIQUID ORAL EVERY 6 HOURS
Refills: 0 | Status: DISCONTINUED | OUTPATIENT
Start: 2025-08-19 | End: 2025-08-22

## 2025-08-19 RX ORDER — PROPRANOLOL HCL 60 MG
60 TABLET ORAL DAILY
Refills: 0 | Status: DISCONTINUED | OUTPATIENT
Start: 2025-08-20 | End: 2025-08-22

## 2025-08-19 RX ORDER — ENOXAPARIN SODIUM 100 MG/ML
40 INJECTION SUBCUTANEOUS EVERY 24 HOURS
Refills: 0 | Status: DISCONTINUED | OUTPATIENT
Start: 2025-08-19 | End: 2025-08-22

## 2025-08-19 RX ORDER — CEFTRIAXONE 500 MG/1
1000 INJECTION, POWDER, FOR SOLUTION INTRAMUSCULAR; INTRAVENOUS EVERY 24 HOURS
Refills: 0 | Status: DISCONTINUED | OUTPATIENT
Start: 2025-08-19 | End: 2025-08-20

## 2025-08-19 RX ORDER — PROPRANOLOL HCL 60 MG
1 TABLET ORAL
Refills: 0 | DISCHARGE

## 2025-08-19 RX ORDER — ROSUVASTATIN CALCIUM 20 MG/1
10 TABLET, FILM COATED ORAL AT BEDTIME
Refills: 0 | Status: DISCONTINUED | OUTPATIENT
Start: 2025-08-19 | End: 2025-08-22

## 2025-08-19 RX ORDER — ACETAMINOPHEN 500 MG/5ML
650 LIQUID (ML) ORAL EVERY 6 HOURS
Refills: 0 | Status: DISCONTINUED | OUTPATIENT
Start: 2025-08-19 | End: 2025-08-22

## 2025-08-19 RX ADMIN — DEXTROMETHORPHAN HBR, GUAIFENESIN 200 MILLIGRAM(S): 200 LIQUID ORAL at 17:23

## 2025-08-19 RX ADMIN — TOPIRAMATE 25 MILLIGRAM(S): 25 TABLET, FILM COATED ORAL at 21:26

## 2025-08-19 RX ADMIN — Medication 400 MILLIGRAM(S): at 16:54

## 2025-08-19 RX ADMIN — Medication 400 MILLIGRAM(S): at 20:41

## 2025-08-19 RX ADMIN — ROSUVASTATIN CALCIUM 10 MILLIGRAM(S): 20 TABLET, FILM COATED ORAL at 21:27

## 2025-08-19 RX ADMIN — DEXTROMETHORPHAN HBR, GUAIFENESIN 200 MILLIGRAM(S): 200 LIQUID ORAL at 23:10

## 2025-08-19 RX ADMIN — CEFTRIAXONE 100 MILLIGRAM(S): 500 INJECTION, POWDER, FOR SOLUTION INTRAMUSCULAR; INTRAVENOUS at 20:41

## 2025-08-19 RX ADMIN — ENOXAPARIN SODIUM 40 MILLIGRAM(S): 100 INJECTION SUBCUTANEOUS at 16:54

## 2025-08-19 RX ADMIN — Medication 20 MILLIGRAM(S): at 17:23

## 2025-08-19 RX ADMIN — Medication 1000 MILLIGRAM(S): at 21:41

## 2025-08-20 ENCOUNTER — TRANSCRIPTION ENCOUNTER (OUTPATIENT)
Age: 70
End: 2025-08-20

## 2025-08-20 DIAGNOSIS — K21.9 GASTRO-ESOPHAGEAL REFLUX DISEASE WITHOUT ESOPHAGITIS: ICD-10-CM

## 2025-08-20 DIAGNOSIS — R25.1 TREMOR, UNSPECIFIED: ICD-10-CM

## 2025-08-20 DIAGNOSIS — E78.5 HYPERLIPIDEMIA, UNSPECIFIED: ICD-10-CM

## 2025-08-20 LAB
ALBUMIN SERPL ELPH-MCNC: 3.1 G/DL — LOW (ref 3.3–5)
ALP SERPL-CCNC: 48 U/L — SIGNIFICANT CHANGE UP (ref 40–120)
ALT FLD-CCNC: 18 U/L — SIGNIFICANT CHANGE UP (ref 12–78)
ANION GAP SERPL CALC-SCNC: 8 MMOL/L — SIGNIFICANT CHANGE UP (ref 5–17)
AST SERPL-CCNC: 22 U/L — SIGNIFICANT CHANGE UP (ref 15–37)
BASOPHILS # BLD AUTO: 0.03 K/UL — SIGNIFICANT CHANGE UP (ref 0–0.2)
BASOPHILS NFR BLD AUTO: 0.6 % — SIGNIFICANT CHANGE UP (ref 0–2)
BILIRUB SERPL-MCNC: 0.3 MG/DL — SIGNIFICANT CHANGE UP (ref 0.2–1.2)
BUN SERPL-MCNC: 10 MG/DL — SIGNIFICANT CHANGE UP (ref 7–23)
CALCIUM SERPL-MCNC: 8.7 MG/DL — SIGNIFICANT CHANGE UP (ref 8.5–10.1)
CHLORIDE SERPL-SCNC: 109 MMOL/L — HIGH (ref 96–108)
CO2 SERPL-SCNC: 23 MMOL/L — SIGNIFICANT CHANGE UP (ref 22–31)
CREAT SERPL-MCNC: 0.56 MG/DL — SIGNIFICANT CHANGE UP (ref 0.5–1.3)
EGFR: 98 ML/MIN/1.73M2 — SIGNIFICANT CHANGE UP
EGFR: 98 ML/MIN/1.73M2 — SIGNIFICANT CHANGE UP
EOSINOPHIL # BLD AUTO: 0.01 K/UL — SIGNIFICANT CHANGE UP (ref 0–0.5)
EOSINOPHIL NFR BLD AUTO: 0.2 % — SIGNIFICANT CHANGE UP (ref 0–6)
GLUCOSE SERPL-MCNC: 103 MG/DL — HIGH (ref 70–99)
HCT VFR BLD CALC: 38.5 % — SIGNIFICANT CHANGE UP (ref 34.5–45)
HGB BLD-MCNC: 12.6 G/DL — SIGNIFICANT CHANGE UP (ref 11.5–15.5)
IMM GRANULOCYTES # BLD AUTO: 0.01 K/UL — SIGNIFICANT CHANGE UP (ref 0–0.07)
IMM GRANULOCYTES NFR BLD AUTO: 0.2 % — SIGNIFICANT CHANGE UP (ref 0–0.9)
LEGIONELLA AG UR QL: NEGATIVE — SIGNIFICANT CHANGE UP
LYMPHOCYTES # BLD AUTO: 1.41 K/UL — SIGNIFICANT CHANGE UP (ref 1–3.3)
LYMPHOCYTES NFR BLD AUTO: 26.4 % — SIGNIFICANT CHANGE UP (ref 13–44)
MCHC RBC-ENTMCNC: 29.1 PG — SIGNIFICANT CHANGE UP (ref 27–34)
MCHC RBC-ENTMCNC: 32.7 G/DL — SIGNIFICANT CHANGE UP (ref 32–36)
MCV RBC AUTO: 88.9 FL — SIGNIFICANT CHANGE UP (ref 80–100)
MONOCYTES # BLD AUTO: 0.54 K/UL — SIGNIFICANT CHANGE UP (ref 0–0.9)
MONOCYTES NFR BLD AUTO: 10.1 % — SIGNIFICANT CHANGE UP (ref 2–14)
NEUTROPHILS # BLD AUTO: 3.34 K/UL — SIGNIFICANT CHANGE UP (ref 1.8–7.4)
NEUTROPHILS NFR BLD AUTO: 62.5 % — SIGNIFICANT CHANGE UP (ref 43–77)
NRBC # BLD AUTO: 0 K/UL — SIGNIFICANT CHANGE UP (ref 0–0)
NRBC # FLD: 0 K/UL — SIGNIFICANT CHANGE UP (ref 0–0)
NRBC BLD AUTO-RTO: 0 /100 WBCS — SIGNIFICANT CHANGE UP (ref 0–0)
PLATELET # BLD AUTO: 157 K/UL — SIGNIFICANT CHANGE UP (ref 150–400)
PMV BLD: 10.5 FL — SIGNIFICANT CHANGE UP (ref 7–13)
POTASSIUM SERPL-MCNC: 3.2 MMOL/L — LOW (ref 3.5–5.3)
POTASSIUM SERPL-SCNC: 3.2 MMOL/L — LOW (ref 3.5–5.3)
PROT SERPL-MCNC: 6.4 G/DL — SIGNIFICANT CHANGE UP (ref 6–8.3)
RBC # BLD: 4.33 M/UL — SIGNIFICANT CHANGE UP (ref 3.8–5.2)
RBC # FLD: 13.2 % — SIGNIFICANT CHANGE UP (ref 10.3–14.5)
S PNEUM AG UR QL: NEGATIVE — SIGNIFICANT CHANGE UP
SODIUM SERPL-SCNC: 140 MMOL/L — SIGNIFICANT CHANGE UP (ref 135–145)
WBC # BLD: 5.34 K/UL — SIGNIFICANT CHANGE UP (ref 3.8–10.5)
WBC # FLD AUTO: 5.34 K/UL — SIGNIFICANT CHANGE UP (ref 3.8–10.5)

## 2025-08-20 PROCEDURE — 80053 COMPREHEN METABOLIC PANEL: CPT

## 2025-08-20 PROCEDURE — 71250 CT THORAX DX C-: CPT

## 2025-08-20 PROCEDURE — 99233 SBSQ HOSP IP/OBS HIGH 50: CPT

## 2025-08-20 PROCEDURE — 71045 X-RAY EXAM CHEST 1 VIEW: CPT

## 2025-08-20 PROCEDURE — 87637 SARSCOV2&INF A&B&RSV AMP PRB: CPT

## 2025-08-20 PROCEDURE — 81001 URINALYSIS AUTO W/SCOPE: CPT

## 2025-08-20 PROCEDURE — 87040 BLOOD CULTURE FOR BACTERIA: CPT

## 2025-08-20 PROCEDURE — 87899 AGENT NOS ASSAY W/OPTIC: CPT

## 2025-08-20 PROCEDURE — 97162 PT EVAL MOD COMPLEX 30 MIN: CPT

## 2025-08-20 PROCEDURE — 99233 SBSQ HOSP IP/OBS HIGH 50: CPT | Mod: GC

## 2025-08-20 PROCEDURE — 85025 COMPLETE CBC W/AUTO DIFF WBC: CPT

## 2025-08-20 PROCEDURE — 93005 ELECTROCARDIOGRAM TRACING: CPT

## 2025-08-20 PROCEDURE — 84484 ASSAY OF TROPONIN QUANT: CPT

## 2025-08-20 PROCEDURE — 84145 PROCALCITONIN (PCT): CPT

## 2025-08-20 PROCEDURE — 36415 COLL VENOUS BLD VENIPUNCTURE: CPT

## 2025-08-20 RX ADMIN — Medication 20 MILLIGRAM(S): at 17:21

## 2025-08-20 RX ADMIN — TOPIRAMATE 25 MILLIGRAM(S): 25 TABLET, FILM COATED ORAL at 21:43

## 2025-08-20 RX ADMIN — DEXTROMETHORPHAN HBR, GUAIFENESIN 200 MILLIGRAM(S): 200 LIQUID ORAL at 12:42

## 2025-08-20 RX ADMIN — Medication 60 MILLIGRAM(S): at 05:40

## 2025-08-20 RX ADMIN — ROSUVASTATIN CALCIUM 10 MILLIGRAM(S): 20 TABLET, FILM COATED ORAL at 21:44

## 2025-08-20 RX ADMIN — DEXTROMETHORPHAN HBR, GUAIFENESIN 200 MILLIGRAM(S): 200 LIQUID ORAL at 17:21

## 2025-08-20 RX ADMIN — Medication 20 MILLIGRAM(S): at 05:40

## 2025-08-20 RX ADMIN — DEXTROMETHORPHAN HBR, GUAIFENESIN 200 MILLIGRAM(S): 200 LIQUID ORAL at 23:04

## 2025-08-20 RX ADMIN — ENOXAPARIN SODIUM 40 MILLIGRAM(S): 100 INJECTION SUBCUTANEOUS at 17:22

## 2025-08-20 RX ADMIN — DEXTROMETHORPHAN HBR, GUAIFENESIN 200 MILLIGRAM(S): 200 LIQUID ORAL at 05:40

## 2025-08-20 RX ADMIN — Medication 3 MILLIGRAM(S): at 21:43

## 2025-08-21 LAB
ALBUMIN SERPL ELPH-MCNC: 3.2 G/DL — LOW (ref 3.3–5)
ALP SERPL-CCNC: 52 U/L — SIGNIFICANT CHANGE UP (ref 40–120)
ALT FLD-CCNC: 21 U/L — SIGNIFICANT CHANGE UP (ref 12–78)
ANION GAP SERPL CALC-SCNC: 6 MMOL/L — SIGNIFICANT CHANGE UP (ref 5–17)
AST SERPL-CCNC: 24 U/L — SIGNIFICANT CHANGE UP (ref 15–37)
BASOPHILS # BLD AUTO: 0.03 K/UL — SIGNIFICANT CHANGE UP (ref 0–0.2)
BASOPHILS NFR BLD AUTO: 0.7 % — SIGNIFICANT CHANGE UP (ref 0–2)
BILIRUB SERPL-MCNC: 0.4 MG/DL — SIGNIFICANT CHANGE UP (ref 0.2–1.2)
BUN SERPL-MCNC: 13 MG/DL — SIGNIFICANT CHANGE UP (ref 7–23)
CALCIUM SERPL-MCNC: 9 MG/DL — SIGNIFICANT CHANGE UP (ref 8.5–10.1)
CHLORIDE SERPL-SCNC: 111 MMOL/L — HIGH (ref 96–108)
CO2 SERPL-SCNC: 24 MMOL/L — SIGNIFICANT CHANGE UP (ref 22–31)
CREAT SERPL-MCNC: 0.58 MG/DL — SIGNIFICANT CHANGE UP (ref 0.5–1.3)
EGFR: 97 ML/MIN/1.73M2 — SIGNIFICANT CHANGE UP
EGFR: 97 ML/MIN/1.73M2 — SIGNIFICANT CHANGE UP
EOSINOPHIL # BLD AUTO: 0.04 K/UL — SIGNIFICANT CHANGE UP (ref 0–0.5)
EOSINOPHIL NFR BLD AUTO: 0.9 % — SIGNIFICANT CHANGE UP (ref 0–6)
GLUCOSE SERPL-MCNC: 125 MG/DL — HIGH (ref 70–99)
HCT VFR BLD CALC: 40.1 % — SIGNIFICANT CHANGE UP (ref 34.5–45)
HGB BLD-MCNC: 13.5 G/DL — SIGNIFICANT CHANGE UP (ref 11.5–15.5)
IMM GRANULOCYTES # BLD AUTO: 0.01 K/UL — SIGNIFICANT CHANGE UP (ref 0–0.07)
IMM GRANULOCYTES NFR BLD AUTO: 0.2 % — SIGNIFICANT CHANGE UP (ref 0–0.9)
LYMPHOCYTES # BLD AUTO: 2.12 K/UL — SIGNIFICANT CHANGE UP (ref 1–3.3)
LYMPHOCYTES NFR BLD AUTO: 48.7 % — HIGH (ref 13–44)
MAGNESIUM SERPL-MCNC: 2 MG/DL — SIGNIFICANT CHANGE UP (ref 1.6–2.6)
MCHC RBC-ENTMCNC: 29.3 PG — SIGNIFICANT CHANGE UP (ref 27–34)
MCHC RBC-ENTMCNC: 33.7 G/DL — SIGNIFICANT CHANGE UP (ref 32–36)
MCV RBC AUTO: 87 FL — SIGNIFICANT CHANGE UP (ref 80–100)
MONOCYTES # BLD AUTO: 0.37 K/UL — SIGNIFICANT CHANGE UP (ref 0–0.9)
MONOCYTES NFR BLD AUTO: 8.5 % — SIGNIFICANT CHANGE UP (ref 2–14)
NEUTROPHILS # BLD AUTO: 1.78 K/UL — LOW (ref 1.8–7.4)
NEUTROPHILS NFR BLD AUTO: 41 % — LOW (ref 43–77)
NRBC # BLD AUTO: 0 K/UL — SIGNIFICANT CHANGE UP (ref 0–0)
NRBC # FLD: 0 K/UL — SIGNIFICANT CHANGE UP (ref 0–0)
NRBC BLD AUTO-RTO: 0 /100 WBCS — SIGNIFICANT CHANGE UP (ref 0–0)
PHOSPHATE SERPL-MCNC: 3.1 MG/DL — SIGNIFICANT CHANGE UP (ref 2.5–4.5)
PLATELET # BLD AUTO: 170 K/UL — SIGNIFICANT CHANGE UP (ref 150–400)
PMV BLD: 10.8 FL — SIGNIFICANT CHANGE UP (ref 7–13)
POTASSIUM SERPL-MCNC: 3.2 MMOL/L — LOW (ref 3.5–5.3)
POTASSIUM SERPL-SCNC: 3.2 MMOL/L — LOW (ref 3.5–5.3)
PROT SERPL-MCNC: 6.6 G/DL — SIGNIFICANT CHANGE UP (ref 6–8.3)
RBC # BLD: 4.61 M/UL — SIGNIFICANT CHANGE UP (ref 3.8–5.2)
RBC # FLD: 13.2 % — SIGNIFICANT CHANGE UP (ref 10.3–14.5)
S PYO DNA THROAT QL NAA+PROBE: SIGNIFICANT CHANGE UP
SODIUM SERPL-SCNC: 141 MMOL/L — SIGNIFICANT CHANGE UP (ref 135–145)
WBC # BLD: 4.35 K/UL — SIGNIFICANT CHANGE UP (ref 3.8–10.5)
WBC # FLD AUTO: 4.35 K/UL — SIGNIFICANT CHANGE UP (ref 3.8–10.5)

## 2025-08-21 PROCEDURE — 71045 X-RAY EXAM CHEST 1 VIEW: CPT

## 2025-08-21 PROCEDURE — 81001 URINALYSIS AUTO W/SCOPE: CPT

## 2025-08-21 PROCEDURE — 87040 BLOOD CULTURE FOR BACTERIA: CPT

## 2025-08-21 PROCEDURE — 84484 ASSAY OF TROPONIN QUANT: CPT

## 2025-08-21 PROCEDURE — 97110 THERAPEUTIC EXERCISES: CPT

## 2025-08-21 PROCEDURE — 99233 SBSQ HOSP IP/OBS HIGH 50: CPT

## 2025-08-21 PROCEDURE — 84100 ASSAY OF PHOSPHORUS: CPT

## 2025-08-21 PROCEDURE — 85025 COMPLETE CBC W/AUTO DIFF WBC: CPT

## 2025-08-21 PROCEDURE — 97162 PT EVAL MOD COMPLEX 30 MIN: CPT

## 2025-08-21 PROCEDURE — 87798 DETECT AGENT NOS DNA AMP: CPT

## 2025-08-21 PROCEDURE — 87651 STREP A DNA AMP PROBE: CPT

## 2025-08-21 PROCEDURE — 71250 CT THORAX DX C-: CPT

## 2025-08-21 PROCEDURE — 83735 ASSAY OF MAGNESIUM: CPT

## 2025-08-21 PROCEDURE — 87899 AGENT NOS ASSAY W/OPTIC: CPT

## 2025-08-21 PROCEDURE — 93005 ELECTROCARDIOGRAM TRACING: CPT

## 2025-08-21 PROCEDURE — 87637 SARSCOV2&INF A&B&RSV AMP PRB: CPT

## 2025-08-21 PROCEDURE — 84145 PROCALCITONIN (PCT): CPT

## 2025-08-21 PROCEDURE — 97116 GAIT TRAINING THERAPY: CPT

## 2025-08-21 PROCEDURE — 36415 COLL VENOUS BLD VENIPUNCTURE: CPT

## 2025-08-21 PROCEDURE — 80053 COMPREHEN METABOLIC PANEL: CPT

## 2025-08-21 RX ORDER — LOPERAMIDE HCL 1 MG/7.5ML
2 SOLUTION ORAL DAILY
Refills: 0 | Status: DISCONTINUED | OUTPATIENT
Start: 2025-08-21 | End: 2025-08-22

## 2025-08-21 RX ORDER — LOPERAMIDE HCL 1 MG/7.5ML
2 SOLUTION ORAL ONCE
Refills: 0 | Status: COMPLETED | OUTPATIENT
Start: 2025-08-21 | End: 2025-08-21

## 2025-08-21 RX ADMIN — Medication 3 MILLIGRAM(S): at 21:24

## 2025-08-21 RX ADMIN — DEXTROMETHORPHAN HBR, GUAIFENESIN 200 MILLIGRAM(S): 200 LIQUID ORAL at 06:18

## 2025-08-21 RX ADMIN — Medication 20 MILLIGRAM(S): at 06:18

## 2025-08-21 RX ADMIN — DEXTROMETHORPHAN HBR, GUAIFENESIN 200 MILLIGRAM(S): 200 LIQUID ORAL at 12:24

## 2025-08-21 RX ADMIN — Medication 40 MILLIEQUIVALENT(S): at 09:50

## 2025-08-21 RX ADMIN — TOPIRAMATE 25 MILLIGRAM(S): 25 TABLET, FILM COATED ORAL at 21:24

## 2025-08-21 RX ADMIN — ROSUVASTATIN CALCIUM 10 MILLIGRAM(S): 20 TABLET, FILM COATED ORAL at 21:24

## 2025-08-21 RX ADMIN — Medication 60 MILLIGRAM(S): at 06:18

## 2025-08-21 RX ADMIN — DEXTROMETHORPHAN HBR, GUAIFENESIN 200 MILLIGRAM(S): 200 LIQUID ORAL at 17:13

## 2025-08-21 RX ADMIN — Medication 20 MILLIGRAM(S): at 17:13

## 2025-08-21 RX ADMIN — Medication 40 MILLIEQUIVALENT(S): at 17:13

## 2025-08-21 RX ADMIN — DEXTROMETHORPHAN HBR, GUAIFENESIN 200 MILLIGRAM(S): 200 LIQUID ORAL at 23:18

## 2025-08-21 RX ADMIN — ENOXAPARIN SODIUM 40 MILLIGRAM(S): 100 INJECTION SUBCUTANEOUS at 17:12

## 2025-08-22 ENCOUNTER — TRANSCRIPTION ENCOUNTER (OUTPATIENT)
Age: 70
End: 2025-08-22

## 2025-08-22 VITALS
OXYGEN SATURATION: 97 % | HEART RATE: 70 BPM | SYSTOLIC BLOOD PRESSURE: 118 MMHG | RESPIRATION RATE: 18 BRPM | TEMPERATURE: 98 F | DIASTOLIC BLOOD PRESSURE: 75 MMHG

## 2025-08-22 LAB
ALBUMIN SERPL ELPH-MCNC: 3.3 G/DL — SIGNIFICANT CHANGE UP (ref 3.3–5)
ALP SERPL-CCNC: 55 U/L — SIGNIFICANT CHANGE UP (ref 40–120)
ALT FLD-CCNC: 23 U/L — SIGNIFICANT CHANGE UP (ref 12–78)
ANION GAP SERPL CALC-SCNC: 7 MMOL/L — SIGNIFICANT CHANGE UP (ref 5–17)
AST SERPL-CCNC: 21 U/L — SIGNIFICANT CHANGE UP (ref 15–37)
BASOPHILS # BLD AUTO: 0.02 K/UL — SIGNIFICANT CHANGE UP (ref 0–0.2)
BASOPHILS NFR BLD AUTO: 0.4 % — SIGNIFICANT CHANGE UP (ref 0–2)
BILIRUB SERPL-MCNC: 0.3 MG/DL — SIGNIFICANT CHANGE UP (ref 0.2–1.2)
BUN SERPL-MCNC: 17 MG/DL — SIGNIFICANT CHANGE UP (ref 7–23)
CALCIUM SERPL-MCNC: 9.2 MG/DL — SIGNIFICANT CHANGE UP (ref 8.5–10.1)
CHLORIDE SERPL-SCNC: 111 MMOL/L — HIGH (ref 96–108)
CO2 SERPL-SCNC: 23 MMOL/L — SIGNIFICANT CHANGE UP (ref 22–31)
CREAT SERPL-MCNC: 0.55 MG/DL — SIGNIFICANT CHANGE UP (ref 0.5–1.3)
EGFR: 98 ML/MIN/1.73M2 — SIGNIFICANT CHANGE UP
EGFR: 98 ML/MIN/1.73M2 — SIGNIFICANT CHANGE UP
EOSINOPHIL # BLD AUTO: 0.13 K/UL — SIGNIFICANT CHANGE UP (ref 0–0.5)
EOSINOPHIL NFR BLD AUTO: 2.9 % — SIGNIFICANT CHANGE UP (ref 0–6)
GLUCOSE SERPL-MCNC: 109 MG/DL — HIGH (ref 70–99)
HCT VFR BLD CALC: 40.1 % — SIGNIFICANT CHANGE UP (ref 34.5–45)
HGB BLD-MCNC: 13 G/DL — SIGNIFICANT CHANGE UP (ref 11.5–15.5)
IMM GRANULOCYTES # BLD AUTO: 0.01 K/UL — SIGNIFICANT CHANGE UP (ref 0–0.07)
IMM GRANULOCYTES NFR BLD AUTO: 0.2 % — SIGNIFICANT CHANGE UP (ref 0–0.9)
LYMPHOCYTES # BLD AUTO: 2.32 K/UL — SIGNIFICANT CHANGE UP (ref 1–3.3)
LYMPHOCYTES NFR BLD AUTO: 51.3 % — HIGH (ref 13–44)
MAGNESIUM SERPL-MCNC: 2.1 MG/DL — SIGNIFICANT CHANGE UP (ref 1.6–2.6)
MCHC RBC-ENTMCNC: 28.6 PG — SIGNIFICANT CHANGE UP (ref 27–34)
MCHC RBC-ENTMCNC: 32.4 G/DL — SIGNIFICANT CHANGE UP (ref 32–36)
MCV RBC AUTO: 88.1 FL — SIGNIFICANT CHANGE UP (ref 80–100)
MONOCYTES # BLD AUTO: 0.34 K/UL — SIGNIFICANT CHANGE UP (ref 0–0.9)
MONOCYTES NFR BLD AUTO: 7.5 % — SIGNIFICANT CHANGE UP (ref 2–14)
NEUTROPHILS # BLD AUTO: 1.7 K/UL — LOW (ref 1.8–7.4)
NEUTROPHILS NFR BLD AUTO: 37.7 % — LOW (ref 43–77)
NRBC # BLD AUTO: 0 K/UL — SIGNIFICANT CHANGE UP (ref 0–0)
NRBC # FLD: 0 K/UL — SIGNIFICANT CHANGE UP (ref 0–0)
NRBC BLD AUTO-RTO: 0 /100 WBCS — SIGNIFICANT CHANGE UP (ref 0–0)
PHOSPHATE SERPL-MCNC: 2.9 MG/DL — SIGNIFICANT CHANGE UP (ref 2.5–4.5)
PLATELET # BLD AUTO: 195 K/UL — SIGNIFICANT CHANGE UP (ref 150–400)
PMV BLD: 10.6 FL — SIGNIFICANT CHANGE UP (ref 7–13)
POTASSIUM SERPL-MCNC: 4.1 MMOL/L — SIGNIFICANT CHANGE UP (ref 3.5–5.3)
POTASSIUM SERPL-SCNC: 4.1 MMOL/L — SIGNIFICANT CHANGE UP (ref 3.5–5.3)
PROT SERPL-MCNC: 7 G/DL — SIGNIFICANT CHANGE UP (ref 6–8.3)
RBC # BLD: 4.55 M/UL — SIGNIFICANT CHANGE UP (ref 3.8–5.2)
RBC # FLD: 13.3 % — SIGNIFICANT CHANGE UP (ref 10.3–14.5)
SODIUM SERPL-SCNC: 141 MMOL/L — SIGNIFICANT CHANGE UP (ref 135–145)
WBC # BLD: 4.52 K/UL — SIGNIFICANT CHANGE UP (ref 3.8–10.5)
WBC # FLD AUTO: 4.52 K/UL — SIGNIFICANT CHANGE UP (ref 3.8–10.5)

## 2025-08-22 PROCEDURE — 81001 URINALYSIS AUTO W/SCOPE: CPT

## 2025-08-22 PROCEDURE — 93005 ELECTROCARDIOGRAM TRACING: CPT

## 2025-08-22 PROCEDURE — 85025 COMPLETE CBC W/AUTO DIFF WBC: CPT

## 2025-08-22 PROCEDURE — 71250 CT THORAX DX C-: CPT

## 2025-08-22 PROCEDURE — 36415 COLL VENOUS BLD VENIPUNCTURE: CPT

## 2025-08-22 PROCEDURE — 96374 THER/PROPH/DIAG INJ IV PUSH: CPT

## 2025-08-22 PROCEDURE — 84145 PROCALCITONIN (PCT): CPT

## 2025-08-22 PROCEDURE — 97162 PT EVAL MOD COMPLEX 30 MIN: CPT

## 2025-08-22 PROCEDURE — 96375 TX/PRO/DX INJ NEW DRUG ADDON: CPT

## 2025-08-22 PROCEDURE — 99285 EMERGENCY DEPT VISIT HI MDM: CPT | Mod: 25

## 2025-08-22 PROCEDURE — 87899 AGENT NOS ASSAY W/OPTIC: CPT

## 2025-08-22 PROCEDURE — 83735 ASSAY OF MAGNESIUM: CPT

## 2025-08-22 PROCEDURE — 84484 ASSAY OF TROPONIN QUANT: CPT

## 2025-08-22 PROCEDURE — 96372 THER/PROPH/DIAG INJ SC/IM: CPT

## 2025-08-22 PROCEDURE — 99239 HOSP IP/OBS DSCHRG MGMT >30: CPT

## 2025-08-22 PROCEDURE — 97116 GAIT TRAINING THERAPY: CPT

## 2025-08-22 PROCEDURE — 97110 THERAPEUTIC EXERCISES: CPT

## 2025-08-22 PROCEDURE — 96376 TX/PRO/DX INJ SAME DRUG ADON: CPT

## 2025-08-22 PROCEDURE — 80053 COMPREHEN METABOLIC PANEL: CPT

## 2025-08-22 PROCEDURE — 87798 DETECT AGENT NOS DNA AMP: CPT

## 2025-08-22 PROCEDURE — 87637 SARSCOV2&INF A&B&RSV AMP PRB: CPT

## 2025-08-22 PROCEDURE — 87040 BLOOD CULTURE FOR BACTERIA: CPT

## 2025-08-22 PROCEDURE — 84100 ASSAY OF PHOSPHORUS: CPT

## 2025-08-22 PROCEDURE — 87651 STREP A DNA AMP PROBE: CPT

## 2025-08-22 PROCEDURE — 71045 X-RAY EXAM CHEST 1 VIEW: CPT

## 2025-08-22 RX ORDER — ONDANSETRON HCL/PF 4 MG/2 ML
4 VIAL (ML) INJECTION
Qty: 0 | Refills: 0 | DISCHARGE
Start: 2025-08-22

## 2025-08-22 RX ORDER — DEXTROMETHORPHAN HBR, GUAIFENESIN 200 MG/10ML
10 LIQUID ORAL
Qty: 0 | Refills: 0 | DISCHARGE
Start: 2025-08-22

## 2025-08-22 RX ORDER — MAGNESIUM, ALUMINUM HYDROXIDE 200-200 MG
30 TABLET,CHEWABLE ORAL
Qty: 0 | Refills: 0 | DISCHARGE
Start: 2025-08-22

## 2025-08-22 RX ORDER — BENZONATATE 100 MG
1 CAPSULE ORAL
Qty: 0 | Refills: 0 | DISCHARGE
Start: 2025-08-22

## 2025-08-22 RX ORDER — LOPERAMIDE HCL 1 MG/7.5ML
1 SOLUTION ORAL
Qty: 0 | Refills: 0 | DISCHARGE
Start: 2025-08-22

## 2025-08-22 RX ORDER — MELATONIN 5 MG
1 TABLET ORAL
Qty: 0 | Refills: 0 | DISCHARGE
Start: 2025-08-22

## 2025-08-22 RX ADMIN — DEXTROMETHORPHAN HBR, GUAIFENESIN 200 MILLIGRAM(S): 200 LIQUID ORAL at 06:09

## 2025-08-22 RX ADMIN — DEXTROMETHORPHAN HBR, GUAIFENESIN 200 MILLIGRAM(S): 200 LIQUID ORAL at 12:04

## 2025-08-22 RX ADMIN — Medication 20 MILLIGRAM(S): at 06:10

## 2025-08-25 LAB
CULTURE RESULTS: SIGNIFICANT CHANGE UP
CULTURE RESULTS: SIGNIFICANT CHANGE UP
SPECIMEN SOURCE: SIGNIFICANT CHANGE UP
SPECIMEN SOURCE: SIGNIFICANT CHANGE UP